# Patient Record
Sex: FEMALE | Race: WHITE | NOT HISPANIC OR LATINO | URBAN - METROPOLITAN AREA
[De-identification: names, ages, dates, MRNs, and addresses within clinical notes are randomized per-mention and may not be internally consistent; named-entity substitution may affect disease eponyms.]

---

## 2017-01-26 ENCOUNTER — OFFICE VISIT - HEALTHEAST (OUTPATIENT)
Dept: GERIATRICS | Facility: CLINIC | Age: 82
End: 2017-01-26

## 2017-01-26 DIAGNOSIS — D64.9 ANEMIA, UNSPECIFIED TYPE: ICD-10-CM

## 2017-01-26 DIAGNOSIS — H91.13 PRESBYCUSIS OF BOTH EARS: ICD-10-CM

## 2017-01-26 DIAGNOSIS — K59.00 CONSTIPATION: ICD-10-CM

## 2017-01-26 DIAGNOSIS — I95.0 IDIOPATHIC HYPOTENSION: ICD-10-CM

## 2017-01-26 DIAGNOSIS — G30.1 LATE ONSET ALZHEIMER'S DISEASE WITHOUT BEHAVIORAL DISTURBANCE (H): ICD-10-CM

## 2017-01-26 DIAGNOSIS — M19.90 OSTEOARTHRITIS: ICD-10-CM

## 2017-01-26 DIAGNOSIS — F02.80 LATE ONSET ALZHEIMER'S DISEASE WITHOUT BEHAVIORAL DISTURBANCE (H): ICD-10-CM

## 2017-02-23 ENCOUNTER — OFFICE VISIT - HEALTHEAST (OUTPATIENT)
Dept: GERIATRICS | Facility: CLINIC | Age: 82
End: 2017-02-23

## 2017-02-23 DIAGNOSIS — H91.13 PRESBYCUSIS OF BOTH EARS: ICD-10-CM

## 2017-02-23 DIAGNOSIS — D64.9 ANEMIA, UNSPECIFIED TYPE: ICD-10-CM

## 2017-02-23 DIAGNOSIS — K59.00 CONSTIPATION: ICD-10-CM

## 2017-02-23 DIAGNOSIS — F02.80 LATE ONSET ALZHEIMER'S DISEASE WITHOUT BEHAVIORAL DISTURBANCE (H): ICD-10-CM

## 2017-02-23 DIAGNOSIS — I95.0 IDIOPATHIC HYPOTENSION: ICD-10-CM

## 2017-02-23 DIAGNOSIS — G30.1 LATE ONSET ALZHEIMER'S DISEASE WITHOUT BEHAVIORAL DISTURBANCE (H): ICD-10-CM

## 2017-02-23 DIAGNOSIS — M19.90 OSTEOARTHRITIS: ICD-10-CM

## 2017-03-31 ENCOUNTER — OFFICE VISIT - HEALTHEAST (OUTPATIENT)
Dept: GERIATRICS | Facility: CLINIC | Age: 82
End: 2017-03-31

## 2017-03-31 DIAGNOSIS — D64.9 ANEMIA: ICD-10-CM

## 2017-03-31 DIAGNOSIS — M19.90 DJD (DEGENERATIVE JOINT DISEASE): ICD-10-CM

## 2017-03-31 DIAGNOSIS — F02.80 ALZHEIMER'S DEMENTIA WITHOUT BEHAVIORAL DISTURBANCE (H): ICD-10-CM

## 2017-03-31 DIAGNOSIS — I10 ESSENTIAL HYPERTENSION: ICD-10-CM

## 2017-03-31 DIAGNOSIS — G30.9 ALZHEIMER'S DEMENTIA WITHOUT BEHAVIORAL DISTURBANCE (H): ICD-10-CM

## 2017-04-27 ENCOUNTER — OFFICE VISIT - HEALTHEAST (OUTPATIENT)
Dept: GERIATRICS | Facility: CLINIC | Age: 82
End: 2017-04-27

## 2017-04-27 DIAGNOSIS — F02.80 LATE ONSET ALZHEIMER'S DISEASE WITHOUT BEHAVIORAL DISTURBANCE (H): ICD-10-CM

## 2017-04-27 DIAGNOSIS — H91.13 PRESBYCUSIS OF BOTH EARS: ICD-10-CM

## 2017-04-27 DIAGNOSIS — K59.00 CONSTIPATION, UNSPECIFIED CONSTIPATION TYPE: ICD-10-CM

## 2017-04-27 DIAGNOSIS — G30.1 LATE ONSET ALZHEIMER'S DISEASE WITHOUT BEHAVIORAL DISTURBANCE (H): ICD-10-CM

## 2017-04-27 DIAGNOSIS — I95.0 IDIOPATHIC HYPOTENSION: ICD-10-CM

## 2017-04-27 DIAGNOSIS — M19.90 OSTEOARTHRITIS: ICD-10-CM

## 2017-05-11 ENCOUNTER — OFFICE VISIT - HEALTHEAST (OUTPATIENT)
Dept: GERIATRICS | Facility: CLINIC | Age: 82
End: 2017-05-11

## 2017-05-11 DIAGNOSIS — G30.1 LATE ONSET ALZHEIMER'S DISEASE WITHOUT BEHAVIORAL DISTURBANCE (H): ICD-10-CM

## 2017-05-11 DIAGNOSIS — F02.80 LATE ONSET ALZHEIMER'S DISEASE WITHOUT BEHAVIORAL DISTURBANCE (H): ICD-10-CM

## 2017-05-11 DIAGNOSIS — K59.00 CONSTIPATION, UNSPECIFIED CONSTIPATION TYPE: ICD-10-CM

## 2017-05-11 DIAGNOSIS — Z87.19 S/P DILATATION OF ESOPHAGEAL STRICTURE: ICD-10-CM

## 2017-05-11 DIAGNOSIS — M19.90 OSTEOARTHRITIS: ICD-10-CM

## 2017-05-11 DIAGNOSIS — H91.13 PRESBYCUSIS OF BOTH EARS: ICD-10-CM

## 2017-05-11 DIAGNOSIS — Z98.890 S/P DILATATION OF ESOPHAGEAL STRICTURE: ICD-10-CM

## 2017-05-11 DIAGNOSIS — I95.0 IDIOPATHIC HYPOTENSION: ICD-10-CM

## 2017-06-30 ENCOUNTER — OFFICE VISIT - HEALTHEAST (OUTPATIENT)
Dept: GERIATRICS | Facility: CLINIC | Age: 82
End: 2017-06-30

## 2017-06-30 DIAGNOSIS — D64.9 ANEMIA, UNSPECIFIED TYPE: ICD-10-CM

## 2017-06-30 DIAGNOSIS — G30.1 LATE ONSET ALZHEIMER'S DISEASE WITHOUT BEHAVIORAL DISTURBANCE (H): ICD-10-CM

## 2017-06-30 DIAGNOSIS — M19.90 DJD (DEGENERATIVE JOINT DISEASE): ICD-10-CM

## 2017-06-30 DIAGNOSIS — I10 ESSENTIAL HYPERTENSION: ICD-10-CM

## 2017-06-30 DIAGNOSIS — F02.80 LATE ONSET ALZHEIMER'S DISEASE WITHOUT BEHAVIORAL DISTURBANCE (H): ICD-10-CM

## 2017-07-27 ENCOUNTER — OFFICE VISIT - HEALTHEAST (OUTPATIENT)
Dept: GERIATRICS | Facility: CLINIC | Age: 82
End: 2017-07-27

## 2017-07-27 DIAGNOSIS — D64.9 ANEMIA, UNSPECIFIED TYPE: ICD-10-CM

## 2017-07-27 DIAGNOSIS — H91.13 PRESBYCUSIS OF BOTH EARS: ICD-10-CM

## 2017-07-27 DIAGNOSIS — I95.0 IDIOPATHIC HYPOTENSION: ICD-10-CM

## 2017-07-27 DIAGNOSIS — F02.80 LATE ONSET ALZHEIMER'S DISEASE WITHOUT BEHAVIORAL DISTURBANCE (H): ICD-10-CM

## 2017-07-27 DIAGNOSIS — Z87.19 S/P DILATATION OF ESOPHAGEAL STRICTURE: ICD-10-CM

## 2017-07-27 DIAGNOSIS — G30.1 LATE ONSET ALZHEIMER'S DISEASE WITHOUT BEHAVIORAL DISTURBANCE (H): ICD-10-CM

## 2017-07-27 DIAGNOSIS — K59.00 CONSTIPATION, UNSPECIFIED CONSTIPATION TYPE: ICD-10-CM

## 2017-07-27 DIAGNOSIS — M15.0 PRIMARY OSTEOARTHRITIS INVOLVING MULTIPLE JOINTS: ICD-10-CM

## 2017-07-27 DIAGNOSIS — Z98.890 S/P DILATATION OF ESOPHAGEAL STRICTURE: ICD-10-CM

## 2017-08-14 ENCOUNTER — OFFICE VISIT - HEALTHEAST (OUTPATIENT)
Dept: GERIATRICS | Facility: CLINIC | Age: 82
End: 2017-08-14

## 2017-08-14 DIAGNOSIS — Z98.890 S/P DILATATION OF ESOPHAGEAL STRICTURE: ICD-10-CM

## 2017-08-14 DIAGNOSIS — M15.0 PRIMARY OSTEOARTHRITIS INVOLVING MULTIPLE JOINTS: ICD-10-CM

## 2017-08-14 DIAGNOSIS — I95.0 IDIOPATHIC HYPOTENSION: ICD-10-CM

## 2017-08-14 DIAGNOSIS — K59.00 CONSTIPATION, UNSPECIFIED CONSTIPATION TYPE: ICD-10-CM

## 2017-08-14 DIAGNOSIS — D64.9 ANEMIA, UNSPECIFIED TYPE: ICD-10-CM

## 2017-08-14 DIAGNOSIS — H91.13 PRESBYCUSIS OF BOTH EARS: ICD-10-CM

## 2017-08-14 DIAGNOSIS — G30.1 LATE ONSET ALZHEIMER'S DISEASE WITHOUT BEHAVIORAL DISTURBANCE (H): ICD-10-CM

## 2017-08-14 DIAGNOSIS — Z87.19 S/P DILATATION OF ESOPHAGEAL STRICTURE: ICD-10-CM

## 2017-08-14 DIAGNOSIS — F02.80 LATE ONSET ALZHEIMER'S DISEASE WITHOUT BEHAVIORAL DISTURBANCE (H): ICD-10-CM

## 2017-09-30 ENCOUNTER — OFFICE VISIT - HEALTHEAST (OUTPATIENT)
Dept: GERIATRICS | Facility: CLINIC | Age: 82
End: 2017-09-30

## 2017-09-30 DIAGNOSIS — F03.90 DEMENTIA (H): ICD-10-CM

## 2017-09-30 DIAGNOSIS — D64.9 ANEMIA, UNSPECIFIED TYPE: ICD-10-CM

## 2017-09-30 DIAGNOSIS — I10 ESSENTIAL HYPERTENSION: ICD-10-CM

## 2017-09-30 DIAGNOSIS — M19.90 DJD (DEGENERATIVE JOINT DISEASE): ICD-10-CM

## 2017-10-12 ENCOUNTER — OFFICE VISIT - HEALTHEAST (OUTPATIENT)
Dept: GERIATRICS | Facility: CLINIC | Age: 82
End: 2017-10-12

## 2017-10-12 DIAGNOSIS — F02.80 LATE ONSET ALZHEIMER'S DISEASE WITHOUT BEHAVIORAL DISTURBANCE (H): ICD-10-CM

## 2017-10-12 DIAGNOSIS — D64.9 ANEMIA, UNSPECIFIED TYPE: ICD-10-CM

## 2017-10-12 DIAGNOSIS — Z98.890 S/P DILATATION OF ESOPHAGEAL STRICTURE: ICD-10-CM

## 2017-10-12 DIAGNOSIS — G30.1 LATE ONSET ALZHEIMER'S DISEASE WITHOUT BEHAVIORAL DISTURBANCE (H): ICD-10-CM

## 2017-10-12 DIAGNOSIS — Z87.19 S/P DILATATION OF ESOPHAGEAL STRICTURE: ICD-10-CM

## 2017-10-12 DIAGNOSIS — K59.00 CONSTIPATION, UNSPECIFIED CONSTIPATION TYPE: ICD-10-CM

## 2017-10-12 DIAGNOSIS — M15.0 PRIMARY OSTEOARTHRITIS INVOLVING MULTIPLE JOINTS: ICD-10-CM

## 2017-10-12 DIAGNOSIS — H91.13 PRESBYCUSIS OF BOTH EARS: ICD-10-CM

## 2017-10-12 DIAGNOSIS — I95.0 IDIOPATHIC HYPOTENSION: ICD-10-CM

## 2017-11-02 ENCOUNTER — OFFICE VISIT - HEALTHEAST (OUTPATIENT)
Dept: GERIATRICS | Facility: CLINIC | Age: 82
End: 2017-11-02

## 2017-11-02 DIAGNOSIS — Z87.19 S/P DILATATION OF ESOPHAGEAL STRICTURE: ICD-10-CM

## 2017-11-02 DIAGNOSIS — M15.0 PRIMARY OSTEOARTHRITIS INVOLVING MULTIPLE JOINTS: ICD-10-CM

## 2017-11-02 DIAGNOSIS — Z98.890 S/P DILATATION OF ESOPHAGEAL STRICTURE: ICD-10-CM

## 2017-11-02 DIAGNOSIS — D64.9 ANEMIA, UNSPECIFIED TYPE: ICD-10-CM

## 2017-11-02 DIAGNOSIS — G30.1 LATE ONSET ALZHEIMER'S DISEASE WITHOUT BEHAVIORAL DISTURBANCE (H): ICD-10-CM

## 2017-11-02 DIAGNOSIS — I95.0 IDIOPATHIC HYPOTENSION: ICD-10-CM

## 2017-11-02 DIAGNOSIS — F02.80 LATE ONSET ALZHEIMER'S DISEASE WITHOUT BEHAVIORAL DISTURBANCE (H): ICD-10-CM

## 2017-11-02 DIAGNOSIS — K59.00 CONSTIPATION, UNSPECIFIED CONSTIPATION TYPE: ICD-10-CM

## 2017-11-02 DIAGNOSIS — H91.13 PRESBYCUSIS OF BOTH EARS: ICD-10-CM

## 2017-12-31 ENCOUNTER — OFFICE VISIT - HEALTHEAST (OUTPATIENT)
Dept: GERIATRICS | Facility: CLINIC | Age: 82
End: 2017-12-31

## 2017-12-31 DIAGNOSIS — F02.80 LATE ONSET ALZHEIMER'S DISEASE WITHOUT BEHAVIORAL DISTURBANCE (H): ICD-10-CM

## 2017-12-31 DIAGNOSIS — G30.1 LATE ONSET ALZHEIMER'S DISEASE WITHOUT BEHAVIORAL DISTURBANCE (H): ICD-10-CM

## 2017-12-31 DIAGNOSIS — M19.90 DJD (DEGENERATIVE JOINT DISEASE): ICD-10-CM

## 2017-12-31 DIAGNOSIS — I10 ESSENTIAL HYPERTENSION: ICD-10-CM

## 2017-12-31 DIAGNOSIS — D64.9 ANEMIA, UNSPECIFIED TYPE: ICD-10-CM

## 2018-01-01 ENCOUNTER — COMMUNICATION - HEALTHEAST (OUTPATIENT)
Dept: GERIATRICS | Facility: CLINIC | Age: 83
End: 2018-01-01

## 2018-01-01 ENCOUNTER — RECORDS - HEALTHEAST (OUTPATIENT)
Dept: LAB | Facility: CLINIC | Age: 83
End: 2018-01-01

## 2018-01-01 ENCOUNTER — OFFICE VISIT - HEALTHEAST (OUTPATIENT)
Dept: GERIATRICS | Facility: CLINIC | Age: 83
End: 2018-01-01

## 2018-01-01 DIAGNOSIS — F02.80 LATE ONSET ALZHEIMER'S DISEASE WITHOUT BEHAVIORAL DISTURBANCE (H): ICD-10-CM

## 2018-01-01 DIAGNOSIS — R41.89 COGNITIVE DECLINE: ICD-10-CM

## 2018-01-01 DIAGNOSIS — R63.4 WEIGHT LOSS: ICD-10-CM

## 2018-01-01 DIAGNOSIS — Z87.19 S/P DILATATION OF ESOPHAGEAL STRICTURE: ICD-10-CM

## 2018-01-01 DIAGNOSIS — H91.13 PRESBYCUSIS OF BOTH EARS: ICD-10-CM

## 2018-01-01 DIAGNOSIS — D64.9 ANEMIA, UNSPECIFIED TYPE: ICD-10-CM

## 2018-01-01 DIAGNOSIS — H40.9 GLAUCOMA: ICD-10-CM

## 2018-01-01 DIAGNOSIS — Z98.890 S/P DILATATION OF ESOPHAGEAL STRICTURE: ICD-10-CM

## 2018-01-01 DIAGNOSIS — R53.81 DEBILITY: ICD-10-CM

## 2018-01-01 DIAGNOSIS — M15.0 PRIMARY OSTEOARTHRITIS INVOLVING MULTIPLE JOINTS: ICD-10-CM

## 2018-01-01 DIAGNOSIS — G30.1 LATE ONSET ALZHEIMER'S DISEASE WITHOUT BEHAVIORAL DISTURBANCE (H): ICD-10-CM

## 2018-01-01 DIAGNOSIS — K59.00 CONSTIPATION, UNSPECIFIED CONSTIPATION TYPE: ICD-10-CM

## 2018-01-01 DIAGNOSIS — I10 ESSENTIAL HYPERTENSION: ICD-10-CM

## 2018-01-01 DIAGNOSIS — I95.0 IDIOPATHIC HYPOTENSION: ICD-10-CM

## 2018-01-01 DIAGNOSIS — D50.9 IRON DEFICIENCY ANEMIA, UNSPECIFIED IRON DEFICIENCY ANEMIA TYPE: ICD-10-CM

## 2018-01-01 LAB
ALBUMIN UR-MCNC: ABNORMAL MG/DL
ANION GAP SERPL CALCULATED.3IONS-SCNC: 7 MMOL/L (ref 5–18)
APPEARANCE UR: ABNORMAL
BACTERIA #/AREA URNS HPF: ABNORMAL HPF
BACTERIA SPEC CULT: NORMAL
BILIRUB UR QL STRIP: NEGATIVE
BUN SERPL-MCNC: 30 MG/DL (ref 8–28)
CALCIUM SERPL-MCNC: 9.4 MG/DL (ref 8.5–10.5)
CHLORIDE BLD-SCNC: 113 MMOL/L (ref 98–107)
CO2 SERPL-SCNC: 22 MMOL/L (ref 22–31)
COLOR UR AUTO: YELLOW
CREAT SERPL-MCNC: 0.71 MG/DL (ref 0.6–1.1)
GFR SERPL CREATININE-BSD FRML MDRD: >60 ML/MIN/1.73M2
GLUCOSE BLD-MCNC: 75 MG/DL (ref 70–125)
GLUCOSE UR STRIP-MCNC: NEGATIVE MG/DL
HGB UR QL STRIP: ABNORMAL
KETONES UR STRIP-MCNC: NEGATIVE MG/DL
LEUKOCYTE ESTERASE UR QL STRIP: ABNORMAL
MUCOUS THREADS #/AREA URNS LPF: ABNORMAL LPF
NITRATE UR QL: NEGATIVE
PH UR STRIP: 5.5 [PH] (ref 4.5–8)
POTASSIUM BLD-SCNC: 4.3 MMOL/L (ref 3.5–5)
RBC #/AREA URNS AUTO: ABNORMAL HPF
SODIUM SERPL-SCNC: 142 MMOL/L (ref 136–145)
SP GR UR STRIP: 1.03 (ref 1–1.03)
SQUAMOUS #/AREA URNS AUTO: ABNORMAL LPF
UROBILINOGEN UR STRIP-ACNC: ABNORMAL
WBC #/AREA URNS AUTO: >100 HPF

## 2018-01-01 ASSESSMENT — MIFFLIN-ST. JEOR: SCORE: 732.42

## 2018-01-18 ENCOUNTER — RECORDS - HEALTHEAST (OUTPATIENT)
Dept: LAB | Facility: CLINIC | Age: 83
End: 2018-01-18

## 2018-01-18 LAB
ANION GAP SERPL CALCULATED.3IONS-SCNC: 6 MMOL/L (ref 5–18)
BUN SERPL-MCNC: 32 MG/DL (ref 8–28)
CALCIUM SERPL-MCNC: 9.1 MG/DL (ref 8.5–10.5)
CHLORIDE BLD-SCNC: 112 MMOL/L (ref 98–107)
CO2 SERPL-SCNC: 24 MMOL/L (ref 22–31)
CREAT SERPL-MCNC: 0.74 MG/DL (ref 0.6–1.1)
GFR SERPL CREATININE-BSD FRML MDRD: >60 ML/MIN/1.73M2
GLUCOSE BLD-MCNC: 74 MG/DL (ref 70–125)
POTASSIUM BLD-SCNC: 3.9 MMOL/L (ref 3.5–5)
SODIUM SERPL-SCNC: 142 MMOL/L (ref 136–145)

## 2018-02-01 ENCOUNTER — OFFICE VISIT - HEALTHEAST (OUTPATIENT)
Dept: GERIATRICS | Facility: CLINIC | Age: 83
End: 2018-02-01

## 2018-02-01 DIAGNOSIS — G30.1 LATE ONSET ALZHEIMER'S DISEASE WITHOUT BEHAVIORAL DISTURBANCE (H): ICD-10-CM

## 2018-02-01 DIAGNOSIS — Z87.19 S/P DILATATION OF ESOPHAGEAL STRICTURE: ICD-10-CM

## 2018-02-01 DIAGNOSIS — H91.13 PRESBYCUSIS OF BOTH EARS: ICD-10-CM

## 2018-02-01 DIAGNOSIS — Z98.890 S/P DILATATION OF ESOPHAGEAL STRICTURE: ICD-10-CM

## 2018-02-01 DIAGNOSIS — I95.0 IDIOPATHIC HYPOTENSION: ICD-10-CM

## 2018-02-01 DIAGNOSIS — K59.00 CONSTIPATION, UNSPECIFIED CONSTIPATION TYPE: ICD-10-CM

## 2018-02-01 DIAGNOSIS — F02.80 LATE ONSET ALZHEIMER'S DISEASE WITHOUT BEHAVIORAL DISTURBANCE (H): ICD-10-CM

## 2018-02-01 DIAGNOSIS — M15.0 PRIMARY OSTEOARTHRITIS INVOLVING MULTIPLE JOINTS: ICD-10-CM

## 2018-02-01 DIAGNOSIS — D64.9 ANEMIA, UNSPECIFIED TYPE: ICD-10-CM

## 2018-02-02 ENCOUNTER — AMBULATORY - HEALTHEAST (OUTPATIENT)
Dept: GERIATRICS | Facility: CLINIC | Age: 83
End: 2018-02-02

## 2018-04-30 ENCOUNTER — OFFICE VISIT - HEALTHEAST (OUTPATIENT)
Dept: GERIATRICS | Facility: CLINIC | Age: 83
End: 2018-04-30

## 2018-04-30 DIAGNOSIS — I10 HYPERTENSION: ICD-10-CM

## 2018-04-30 DIAGNOSIS — R53.81 DEBILITY: ICD-10-CM

## 2018-04-30 DIAGNOSIS — R41.89 COGNITIVE IMPAIRMENT: ICD-10-CM

## 2018-04-30 DIAGNOSIS — D64.9 ANEMIA, UNSPECIFIED TYPE: ICD-10-CM

## 2018-06-07 ENCOUNTER — OFFICE VISIT - HEALTHEAST (OUTPATIENT)
Dept: GERIATRICS | Facility: CLINIC | Age: 83
End: 2018-06-07

## 2018-06-07 DIAGNOSIS — G30.1 LATE ONSET ALZHEIMER'S DISEASE WITHOUT BEHAVIORAL DISTURBANCE (H): ICD-10-CM

## 2018-06-07 DIAGNOSIS — M15.0 PRIMARY OSTEOARTHRITIS INVOLVING MULTIPLE JOINTS: ICD-10-CM

## 2018-06-07 DIAGNOSIS — D64.9 ANEMIA, UNSPECIFIED TYPE: ICD-10-CM

## 2018-06-07 DIAGNOSIS — H91.13 PRESBYCUSIS OF BOTH EARS: ICD-10-CM

## 2018-06-07 DIAGNOSIS — Z87.19 STATUS POST DILATION OF ESOPHAGEAL NARROWING: ICD-10-CM

## 2018-06-07 DIAGNOSIS — Z98.890 STATUS POST DILATION OF ESOPHAGEAL NARROWING: ICD-10-CM

## 2018-06-07 DIAGNOSIS — K59.00 CONSTIPATION, UNSPECIFIED CONSTIPATION TYPE: ICD-10-CM

## 2018-06-07 DIAGNOSIS — F02.80 LATE ONSET ALZHEIMER'S DISEASE WITHOUT BEHAVIORAL DISTURBANCE (H): ICD-10-CM

## 2018-06-07 DIAGNOSIS — I95.0 IDIOPATHIC HYPOTENSION: ICD-10-CM

## 2018-08-31 ENCOUNTER — OFFICE VISIT - HEALTHEAST (OUTPATIENT)
Dept: GERIATRICS | Facility: CLINIC | Age: 83
End: 2018-08-31

## 2018-08-31 DIAGNOSIS — R41.89 COGNITIVE DECLINE: ICD-10-CM

## 2018-08-31 DIAGNOSIS — D64.9 ANEMIA, UNSPECIFIED TYPE: ICD-10-CM

## 2018-08-31 DIAGNOSIS — R53.81 DEBILITY: ICD-10-CM

## 2018-08-31 DIAGNOSIS — I10 ESSENTIAL HYPERTENSION: ICD-10-CM

## 2018-09-05 ENCOUNTER — RECORDS - HEALTHEAST (OUTPATIENT)
Dept: LAB | Facility: CLINIC | Age: 83
End: 2018-09-05

## 2018-09-06 LAB
ERYTHROCYTE [DISTWIDTH] IN BLOOD BY AUTOMATED COUNT: 14.3 % (ref 11–14.5)
HCT VFR BLD AUTO: 32.9 % (ref 35–47)
HGB BLD-MCNC: 11 G/DL (ref 12–16)
MCH RBC QN AUTO: 30.2 PG (ref 27–34)
MCHC RBC AUTO-ENTMCNC: 33.4 G/DL (ref 32–36)
MCV RBC AUTO: 90 FL (ref 80–100)
PLATELET # BLD AUTO: 202 THOU/UL (ref 140–440)
PMV BLD AUTO: 10.6 FL (ref 8.5–12.5)
RBC # BLD AUTO: 3.64 MILL/UL (ref 3.8–5.4)
WBC: 4.7 THOU/UL (ref 4–11)

## 2018-09-07 ENCOUNTER — COMMUNICATION - HEALTHEAST (OUTPATIENT)
Dept: GERIATRICS | Facility: CLINIC | Age: 83
End: 2018-09-07

## 2019-01-01 ENCOUNTER — HOME CARE/HOSPICE - HEALTHEAST (OUTPATIENT)
Dept: HOSPICE | Facility: HOSPICE | Age: 84
End: 2019-01-01

## 2019-01-01 ENCOUNTER — COMMUNICATION - HEALTHEAST (OUTPATIENT)
Dept: GERIATRICS | Facility: CLINIC | Age: 84
End: 2019-01-01

## 2019-01-01 ENCOUNTER — OFFICE VISIT - HEALTHEAST (OUTPATIENT)
Dept: GERIATRICS | Facility: CLINIC | Age: 84
End: 2019-01-01

## 2019-01-01 ENCOUNTER — AMBULATORY - HEALTHEAST (OUTPATIENT)
Dept: OTHER | Facility: CLINIC | Age: 84
End: 2019-01-01

## 2019-01-01 ENCOUNTER — RECORDS - HEALTHEAST (OUTPATIENT)
Dept: LAB | Facility: CLINIC | Age: 84
End: 2019-01-01

## 2019-01-01 ENCOUNTER — AMBULATORY - HEALTHEAST (OUTPATIENT)
Dept: HOSPICE | Facility: HOSPICE | Age: 84
End: 2019-01-01

## 2019-01-01 ENCOUNTER — AMBULATORY - HEALTHEAST (OUTPATIENT)
Dept: GERIATRICS | Facility: CLINIC | Age: 84
End: 2019-01-01

## 2019-01-01 DIAGNOSIS — D50.9 IRON DEFICIENCY ANEMIA, UNSPECIFIED IRON DEFICIENCY ANEMIA TYPE: ICD-10-CM

## 2019-01-01 DIAGNOSIS — I10 ESSENTIAL HYPERTENSION: ICD-10-CM

## 2019-01-01 DIAGNOSIS — G30.1 LATE ONSET ALZHEIMER'S DISEASE WITHOUT BEHAVIORAL DISTURBANCE (H): ICD-10-CM

## 2019-01-01 DIAGNOSIS — R63.4 WEIGHT LOSS: ICD-10-CM

## 2019-01-01 DIAGNOSIS — G30.9 ALZHEIMER'S DEMENTIA WITH BEHAVIORAL DISTURBANCE, UNSPECIFIED TIMING OF DEMENTIA ONSET: ICD-10-CM

## 2019-01-01 DIAGNOSIS — R53.81 DEBILITY: ICD-10-CM

## 2019-01-01 DIAGNOSIS — Z51.5 END OF LIFE CARE: ICD-10-CM

## 2019-01-01 DIAGNOSIS — F02.80 LATE ONSET ALZHEIMER'S DISEASE WITHOUT BEHAVIORAL DISTURBANCE (H): ICD-10-CM

## 2019-01-01 DIAGNOSIS — R64 CACHEXIA (H): ICD-10-CM

## 2019-01-01 DIAGNOSIS — R41.89 COGNITIVE DECLINE: ICD-10-CM

## 2019-01-01 DIAGNOSIS — K59.00 CONSTIPATION, UNSPECIFIED CONSTIPATION TYPE: ICD-10-CM

## 2019-01-01 DIAGNOSIS — Z98.890 S/P DILATATION OF ESOPHAGEAL STRICTURE: ICD-10-CM

## 2019-01-01 DIAGNOSIS — H40.10X0 OPEN-ANGLE GLAUCOMA, UNSPECIFIED GLAUCOMA STAGE, UNSPECIFIED LATERALITY, UNSPECIFIED OPEN-ANGLE GLAUCOMA TYPE: ICD-10-CM

## 2019-01-01 DIAGNOSIS — I95.0 IDIOPATHIC HYPOTENSION: ICD-10-CM

## 2019-01-01 DIAGNOSIS — Z87.19 S/P DILATATION OF ESOPHAGEAL STRICTURE: ICD-10-CM

## 2019-01-01 DIAGNOSIS — H91.13 PRESBYCUSIS OF BOTH EARS: ICD-10-CM

## 2019-01-01 DIAGNOSIS — M15.0 PRIMARY OSTEOARTHRITIS INVOLVING MULTIPLE JOINTS: ICD-10-CM

## 2019-01-01 DIAGNOSIS — F02.81 ALZHEIMER'S DEMENTIA WITH BEHAVIORAL DISTURBANCE, UNSPECIFIED TIMING OF DEMENTIA ONSET: ICD-10-CM

## 2019-01-01 LAB
ALBUMIN UR-MCNC: ABNORMAL MG/DL
APPEARANCE UR: ABNORMAL
BACTERIA #/AREA URNS HPF: ABNORMAL HPF
BACTERIA SPEC CULT: ABNORMAL
BILIRUB UR QL STRIP: NEGATIVE
COLOR UR AUTO: YELLOW
GLUCOSE UR STRIP-MCNC: NEGATIVE MG/DL
HGB UR QL STRIP: ABNORMAL
HYALINE CASTS #/AREA URNS LPF: ABNORMAL LPF
KETONES UR STRIP-MCNC: NEGATIVE MG/DL
LEUKOCYTE ESTERASE UR QL STRIP: ABNORMAL
MUCOUS THREADS #/AREA URNS LPF: ABNORMAL LPF
NITRATE UR QL: POSITIVE
PH UR STRIP: 5 [PH] (ref 4.5–8)
RBC #/AREA URNS AUTO: ABNORMAL HPF
SP GR UR STRIP: 1.02 (ref 1–1.03)
SQUAMOUS #/AREA URNS AUTO: ABNORMAL LPF
UROBILINOGEN UR STRIP-ACNC: ABNORMAL
WBC #/AREA URNS AUTO: ABNORMAL HPF

## 2019-01-01 RX ORDER — HYDROMORPHONE HYDROCHLORIDE 1 MG/ML
2 SOLUTION ORAL
Status: SHIPPED | COMMUNITY
Start: 2019-01-01

## 2019-01-01 RX ORDER — HYDROMORPHONE HYDROCHLORIDE 1 MG/ML
2 SOLUTION ORAL EVERY 4 HOURS
Status: SHIPPED | COMMUNITY
Start: 2019-01-01

## 2019-01-01 RX ORDER — HALOPERIDOL 0.5 MG/1
2 TABLET ORAL
Status: SHIPPED | COMMUNITY
Start: 2019-01-01

## 2019-01-01 RX ORDER — HALOPERIDOL 0.5 MG/1
5 TABLET ORAL ONCE
Status: SHIPPED | COMMUNITY
Start: 2019-01-01

## 2019-01-01 ASSESSMENT — MIFFLIN-ST. JEOR
SCORE: 703.39
SCORE: 726.98

## 2021-05-26 VITALS
DIASTOLIC BLOOD PRESSURE: 52 MMHG | RESPIRATION RATE: 18 BRPM | HEART RATE: 62 BPM | SYSTOLIC BLOOD PRESSURE: 116 MMHG | OXYGEN SATURATION: 96 %

## 2021-05-26 VITALS
RESPIRATION RATE: 20 BRPM | OXYGEN SATURATION: 96 % | DIASTOLIC BLOOD PRESSURE: 60 MMHG | SYSTOLIC BLOOD PRESSURE: 108 MMHG | HEART RATE: 67 BPM

## 2021-05-26 VITALS
SYSTOLIC BLOOD PRESSURE: 70 MMHG | DIASTOLIC BLOOD PRESSURE: 38 MMHG | HEART RATE: 100 BPM | RESPIRATION RATE: 24 BRPM | OXYGEN SATURATION: 78 % | TEMPERATURE: 99.2 F

## 2021-05-26 VITALS
SYSTOLIC BLOOD PRESSURE: 90 MMHG | HEART RATE: 80 BPM | OXYGEN SATURATION: 87 % | RESPIRATION RATE: 20 BRPM | DIASTOLIC BLOOD PRESSURE: 40 MMHG

## 2021-05-26 VITALS — OXYGEN SATURATION: 87 % | SYSTOLIC BLOOD PRESSURE: 104 MMHG | DIASTOLIC BLOOD PRESSURE: 50 MMHG

## 2021-05-26 VITALS
HEART RATE: 81 BPM | DIASTOLIC BLOOD PRESSURE: 50 MMHG | RESPIRATION RATE: 16 BRPM | SYSTOLIC BLOOD PRESSURE: 90 MMHG | OXYGEN SATURATION: 95 %

## 2021-05-26 VITALS
OXYGEN SATURATION: 95 % | DIASTOLIC BLOOD PRESSURE: 59 MMHG | SYSTOLIC BLOOD PRESSURE: 118 MMHG | RESPIRATION RATE: 13 BRPM | HEART RATE: 69 BPM

## 2021-05-26 VITALS
DIASTOLIC BLOOD PRESSURE: 50 MMHG | RESPIRATION RATE: 16 BRPM | HEART RATE: 70 BPM | SYSTOLIC BLOOD PRESSURE: 100 MMHG | OXYGEN SATURATION: 93 %

## 2021-05-26 VITALS
SYSTOLIC BLOOD PRESSURE: 112 MMHG | HEART RATE: 71 BPM | RESPIRATION RATE: 16 BRPM | OXYGEN SATURATION: 91 % | DIASTOLIC BLOOD PRESSURE: 60 MMHG

## 2021-05-26 VITALS — RESPIRATION RATE: 10 BRPM | HEART RATE: 100 BPM | OXYGEN SATURATION: 81 %

## 2021-05-28 NOTE — TELEPHONE ENCOUNTER
[facility] Nurse Triage Telephone Note      Provider: HAJA Chaidez  Facility: [facility] Type: LTC    Caller: Luzmaria  Call Back Number:  [phone]    Allergies: Codeine    Reason for call: Pt had an episode of choking in the dining room today, was able to clear on her own. When looking in her mouth the nursing staff had noticed 2 lumps on her left molars that could be puss filled. Also, her left cheek looks to be puffy.   Vitals: BP:  95/57  P:: 67    SPO2: 97% R/A Temp.:  98.0    No pain, LS clear  Verbal Order/Direction given by Provider: Make dental appointment asap    Provider giving order: HAJA Chaidez    Verbal order given to: Luzmaria Peace RN

## 2021-05-28 NOTE — PROGRESS NOTES
[facility]    Facility:   [facility] [[id number]]   Code Status: DNR/DNI       Chief Complaint   Patient presents with     Review Of Multiple Medical Conditions     LTC 4/29/19.       HPI:  Naina is a 89 y.o. female seen for routine physician LT follow up at [facility]. She has diagnoses of HTN, not currently on BP meds, anemia on iron, DJD, dementia. She is pleasant and has no disruptive behavioral issues, generally pleasantly confused. She has visual impairment and is on eye drops.     Today:  Current UTI finished abx, some increased confusion and delusions documented per nursing. Today she has no new complaints. She continues to ambulate with her walker but has needed more assistance and guidance, encouragement, uses wheelchair more lately. Has arthritic pains. She has not been ill recently with cough cold or congestion. Denies shortness of breath, chest pain, diarrhea or abdominal pain.      Past Medical History:  Past Medical History:   Diagnosis Date     Cancer (H)     Anal     Constipation     Chronic     Dementia      Hypertension      Sciatica        Medications:  Current Outpatient Medications   Medication Sig     acetaminophen (TYLENOL) 325 MG tablet Take 650 mg by mouth 3 (three) times a day.      acetaminophen (TYLENOL) 325 MG tablet Take 650 mg by mouth every 4 (four) hours as needed for pain.     aspirin 81 MG EC tablet Take 81 mg by mouth daily.     bisacodyl (DULCOLAX) 10 mg suppository Insert 10 mg into the rectum daily as needed.     calcium, as carbonate, (TUMS) 200 mg calcium (500 mg) chewable tablet Chew 1 tablet 2 (two) times a day.     docusate sodium (COLACE) 100 MG capsule Take 100 mg by mouth once daily.     dorzolamide (TRUSOPT) 2 % ophthalmic solution Administer 1 drop to both eyes 2 (two) times a day.     ferrous sulfate 325 (65 FE) MG tablet Take 1 tablet by mouth daily with breakfast.      latanoprost (XALATAN) 0.005 %  ophthalmic solution Administer 1 drop to both eyes bedtime.     omeprazole (PRILOSEC) 20 MG capsule Take 20 mg by mouth daily.     polyethylene glycol (MIRALAX) 17 gram packet Take 17 g by mouth daily.      polyethylene glycol (MIRALAX) 17 gram packet Take 17 g by mouth daily as needed.     polyvinyl alcohol (LIQUIFILM TEARS) 1.4 % ophthalmic solution Administer 1 drop to both eyes every 2 (two) hours as needed for dry eyes.     timolol maleate (TIMOPTIC) 0.25 % ophthalmic solution Administer 1 drop to both eyes 2 (two) times a day.     timolol maleate (TIMOPTIC) 0.5 % ophthalmic solution Administer 1 drop to both eyes 2 (two) times a day.       Physical Exam:  General: Patient is alert, pleasant elderly female, no distress.   Vitals: /62, Temp 97.5, Pulse 62, RR 20, O2 sat 95% RA.  HEENT: Head is NCAT. Eyes show no injection or icterus. Nares negative. Oropharynx well hydrated.  Lungs: Clear bilaterally. No wheezes.  Cardiovascular: Regular rate and rhythm, systolic murmur.  Abdomen: Soft, no tenderness on exam. Bowel sounds present. No guarding rebound or rigidity.  Extremities: No edema is noted.  Musculoskeletal: Age related degen changes. Marked deformities of fingers.  Psych: Mood appears good.      Labs:  Lab Results   Component Value Date    WBC 4.7 09/06/2018    HGB 11.0 (L) 09/06/2018    HCT 32.9 (L) 09/06/2018    MCV 90 09/06/2018     09/06/2018     Results for orders placed or performed in visit on 12/10/18   Basic Metabolic Panel   Result Value Ref Range    Sodium 142 136 - 145 mmol/L    Potassium 4.3 3.5 - 5.0 mmol/L    Chloride 113 (H) 98 - 107 mmol/L    CO2 22 22 - 31 mmol/L    Anion Gap, Calculation 7 5 - 18 mmol/L    Glucose 75 70 - 125 mg/dL    Calcium 9.4 8.5 - 10.5 mg/dL    BUN 30 (H) 8 - 28 mg/dL    Creatinine 0.71 0.60 - 1.10 mg/dL    GFR MDRD Af Amer >60 >60 mL/min/1.73m2    GFR MDRD Non Af Amer >60 >60 mL/min/1.73m2         Assessment/Plan:  1. DJD. General debilitation. Slow  decline, seems to use wheelchair more than walker now.   2. HTN. Not on medications. BPs satisfactory.  3. Anemia. She is on iron. Last hgb at 11, stable.   4. Cognitive decline. Pleasant and forgetful. Confusion noted per nursing.  5. UTI. Done with abx. No fever.         Electronically signed by: Sandra Wing MD

## 2021-05-29 NOTE — PROGRESS NOTES
[facility]    Name:   Naina Lozada  : 1923  Facility:   [facility] [[id number]]   Room: 217  Code Status: DNR/DNI -   Fac type:   NF (Long Term Care, LTC) -     CHIEF COMPLAINT / REASON FOR VISIT:  Chief Complaint   Patient presents with     Review Of Multiple Medical Conditions     Patient was last seen by me on 2019 and subsequently seen by Dr. Wing on 2019.      HPI: Naina is a very pleasant 89 y.o. female with mild dementia (without behavioral disturbance) and severe hearing loss, who is pretty much independent, ambulatory with a walker and without human assistance, independent with bed mobility, and continent of both bowel and bladder. She exercises every day, usually by walking a good distance around the halls. She has asked me if there is anything to straighten her severely kyphoscoliotic back.  This is about the only complaint she offers.    She has chronic intermittent constipation but may have occasional loose stools. She receives both scheduled Colace (100 mg QD) and MiraLAX (QD and QD PRN), and this has been working well.      She also has a distant history of anal ulcers, as well as a history of urinary retention with incomplete bladder emptying (no current problems), and some right-sided sciatica, although she has offered no complaints over the last few years.     In 2015 she was evaluated by GI, undergoing an upper endoscopy and later a dilatation due to a esophageal stricture at the GE junction. Also noted were Ayush's erosions in a large hiatal hernia, and she is now receiving a proton pump inhibitor with iron supplementation to address her anemia. She was subsequently seen by Dr. Sin in 2016 due to recurrence of her swallowing difficulty. She confirmed that sometimes it took 2 or 3 tries to swallow food. When the dilatation was performed one month earlier by Dr. Raghu Mills, he stated in his note that if  "swallowing difficulty recurred, a video swallow esophogram should be done with a follow-up in their esophageal clinic prior to further endoscopic dilatation. This was performed on 04/14/16. The image acquisition was compromised due to limited patient mobility, but what was noted was a moderate sized sliding hiatal hernia with large volume of gastroesophageal reflux, a Schatzki's ring, and small cervical esophageal diverticulum but no significant stricture. She needs to be careful, making sure that what she eats is in small pieces.    She has a history of anemia.  We did decrease her ferrous sulfate from 325 mg twice daily to 325 mg daily back in May 2017.  A follow-up hemoglobin one month later was 11.3.       CURRENT ISSUES    Osteoarthritis: General stiffness all over, particularly in the morning, but she rarely complained.  Up until my last visit, she was independent and ambulatory every morning; however, the arthritis was taking its toll.  She became mostly wheelchair-bound but was still able to get around using her legs to propel herself.  She was still walking occasionally.  Pain continues to be an issue, but she is nothing if not persistent.  She has described her whole body being in pain, but she does not want more pain medications.  Today, she has gotten herself dressed and is ambulating again independently.  She states, \"I suppose I still have pain, but I walk anyway.\"    Hearing loss: Quite significant.  She can hear with lips almost to the ears.    Weight loss: Unfortunately, she is losing weight.  She was down 4.5 pounds at my last visit and is down another 5.2 pounds.  In October 2018, she was started on Thrive ice cream.    ROS: She remains continent of bowel and bladder.  No complaints of headaches, chest pain, nausea or vomiting, coughing or congestion, dizziness or dyspnea, dysuria, integumentary issues, problems with sleep, or feelings of depression or anxiety.    Past Medical History:   Diagnosis " "Date     Cancer (H)     Anal     Constipation     Chronic     Dementia      Hypertension      Sciatica              No family history on file.    MEDICATIONS: Reviewed from the MAR, physician orders, and earlier progress notes.   Current Outpatient Medications   Medication Sig     acetaminophen (TYLENOL) 325 MG tablet Take 650 mg by mouth 3 (three) times a day.      acetaminophen (TYLENOL) 325 MG tablet Take 650 mg by mouth every 4 (four) hours as needed for pain.     aspirin 81 MG EC tablet Take 81 mg by mouth daily.     bisacodyl (DULCOLAX) 10 mg suppository Insert 10 mg into the rectum daily as needed.     calcium, as carbonate, (TUMS) 200 mg calcium (500 mg) chewable tablet Chew 1 tablet 2 (two) times a day.     docusate sodium (COLACE) 100 MG capsule Take 100 mg by mouth once daily.     dorzolamide (TRUSOPT) 2 % ophthalmic solution Administer 1 drop to both eyes 2 (two) times a day.     ferrous sulfate 325 (65 FE) MG tablet Take 1 tablet by mouth daily with breakfast.      latanoprost (XALATAN) 0.005 % ophthalmic solution Administer 1 drop to both eyes bedtime.     omeprazole (PRILOSEC) 20 MG capsule Take 20 mg by mouth daily.     polyethylene glycol (MIRALAX) 17 gram packet Take 17 g by mouth daily.      polyethylene glycol (MIRALAX) 17 gram packet Take 17 g by mouth daily as needed.     polyvinyl alcohol (LIQUIFILM TEARS) 1.4 % ophthalmic solution Administer 1 drop to both eyes every 2 (two) hours as needed for dry eyes.     timolol maleate (TIMOPTIC) 0.25 % ophthalmic solution Administer 1 drop to both eyes 2 (two) times a day.     timolol maleate (TIMOPTIC) 0.5 % ophthalmic solution Administer 1 drop to both eyes 2 (two) times a day.     ALLERGIES:   Allergies   Allergen Reactions     Codeine      DIET: Regular.  Thrive ice cream.    Vitals:    06/18/19 1638   BP: 118/77   Pulse: 66   Resp: 18   Temp: (!) 95  F (35  C)   SpO2: 94%   Weight: (!) 95 lb 9.6 oz (43.4 kg)   Height: 4' 10\" (1.473 m)   Admission " weight was 96.6 pounds.  After that, she put on weight but has been losing lately, dropping of 4.5 pounds earlier and now 5.2 pounds.    EXAMINATION:   General: Frail-appearing elderly female, sitting in a wheelchair, scooting around using her legs to propel her, in no apparent distress.  Head: Normocephalic and atraumatic.   Eyes: Deformed left pupil, but sclerae are clear.   ENT: Moist oral mucosa. She has her own teeth. No rhinorrhea or nasal discharge. There is significant/severe hearing loss, although she does seem to hear a bit better out of the right ear.   Cardiovascular: Regular rate and rhythm with a coarse 3/6 ANN MARIE at the LSB.  Respiratory: Lungs clear to auscultation bilaterally.   Abdomen: Soft and nontender.   Musculoskeletal/Extremities: Age-related degenerative joint disease. Significant dextrokyphoscoliosis and bilateral hallux valgus deformities with arthritic deformities also noted in the hands. No peripheral edema.  Integument: No rashes, clinically significant lesions, or skin breakdown.   Cognitive/Psychiatric: There are indications of mild dementia. Affect is euthymic.    DIAGNOSTICS:   Results for orders placed or performed in visit on 12/10/18   Basic Metabolic Panel   Result Value Ref Range    Sodium 142 136 - 145 mmol/L    Potassium 4.3 3.5 - 5.0 mmol/L    Chloride 113 (H) 98 - 107 mmol/L    CO2 22 22 - 31 mmol/L    Anion Gap, Calculation 7 5 - 18 mmol/L    Glucose 75 70 - 125 mg/dL    Calcium 9.4 8.5 - 10.5 mg/dL    BUN 30 (H) 8 - 28 mg/dL    Creatinine 0.71 0.60 - 1.10 mg/dL    GFR MDRD Af Amer >60 >60 mL/min/1.73m2    GFR MDRD Non Af Amer >60 >60 mL/min/1.73m2     Lab Results   Component Value Date    WBC 4.7 09/06/2018    HGB 11.0 (L) 09/06/2018    HCT 32.9 (L) 09/06/2018    MCV 90 09/06/2018     09/06/2018     CrCl cannot be calculated (Patient's most recent lab result is older than the maximum 5 days allowed.).  Lab Results   Component Value Date    TSH 2.42 06/10/2015     Lab  Results   Component Value Date    ALT 10 06/10/2015    AST 18 06/10/2015    ALKPHOS 69 06/10/2015    BILITOT 0.3 06/10/2015     ASSESSMENT/Plan:      ICD-10-CM    1. Late onset Alzheimer's disease without behavioral disturbance (mild) G30.1     F02.80    2. Presbycusis of both ears H91.13    3. Primary osteoarthritis involving multiple joints M15.0    4. Weight loss R63.4    5. Idiopathic hypotension I95.0    6. Constipation, unspecified constipation type K59.00    7. Iron deficiency anemia, unspecified iron deficiency anemia type D50.9    8. S/P dilatation of esophageal stricture Z98.890     Z87.19      CHANGES:  None.    CARE PLAN:    The care plan has been reviewed and all orders signed. Changes to care plan, if any, as noted. Otherwise, continue care plan of care.    The above has been created using voice recognition software.  Please be aware that this may unintentionally produce inaccuracies and/or nonsensical sentences.      Electronically signed by:  Braxton Messer CNP

## 2021-05-30 VITALS — WEIGHT: 110.4 LBS

## 2021-05-30 VITALS — WEIGHT: 112.4 LBS

## 2021-05-30 VITALS — WEIGHT: 111 LBS

## 2021-05-31 VITALS — WEIGHT: 111.6 LBS

## 2021-05-31 VITALS — WEIGHT: 110.6 LBS

## 2021-05-31 VITALS — WEIGHT: 110 LBS

## 2021-05-31 VITALS — WEIGHT: 109.2 LBS

## 2021-05-31 VITALS — WEIGHT: 110.2 LBS

## 2021-05-31 NOTE — PROGRESS NOTES
"[facility]    Facility:   [facility] [[id number]]   Code Status: DNR/DNI       Chief Complaint   Patient presents with     Review Of Multiple Medical Conditions     LT 8/26/19.       HPI:  Naina is a 89 y.o. female seen for routine physician [facility] follow up at [facility]. She has diagnoses of HTN, not currently on BP meds, anemia on iron, DJD, dementia. She has no disruptive behavioral issues, generally pleasantly confused. She has visual impairment and is on eye drops.     Today:  She seems pretty stable. No interim concerns since my last visit. No medication changes. She uses wheelchair for longer distances but still uses walker in her room. Has arthritis and variable pain. States \"if it weren't for the arthritis I would be out dancing\". She has not been ill recently with cough cold or congestion. No concerns per nursing. No recent falls and no open areas of skin.       Past Medical History:  Past Medical History:   Diagnosis Date     Cancer (H)     Anal     Constipation     Chronic     Dementia      Hypertension      Sciatica        Medications:  Current Outpatient Medications   Medication Sig     acetaminophen (TYLENOL) 325 MG tablet Take 650 mg by mouth 3 (three) times a day.      acetaminophen (TYLENOL) 325 MG tablet Take 650 mg by mouth every 4 (four) hours as needed for pain.     aspirin 81 MG EC tablet Take 81 mg by mouth daily.     bisacodyl (DULCOLAX) 10 mg suppository Insert 10 mg into the rectum daily as needed.     calcium, as carbonate, (TUMS) 200 mg calcium (500 mg) chewable tablet Chew 1 tablet 2 (two) times a day.     docusate sodium (COLACE) 100 MG capsule Take 100 mg by mouth once daily.     dorzolamide (TRUSOPT) 2 % ophthalmic solution Administer 1 drop to both eyes 2 (two) times a day.     ferrous sulfate 325 (65 FE) MG tablet Take 1 tablet by mouth daily with breakfast.      latanoprost (XALATAN) 0.005 % ophthalmic solution Administer 1 " drop to both eyes bedtime.     omeprazole (PRILOSEC) 20 MG capsule Take 20 mg by mouth daily.     polyethylene glycol (MIRALAX) 17 gram packet Take 17 g by mouth daily.      polyethylene glycol (MIRALAX) 17 gram packet Take 17 g by mouth daily as needed.     polyvinyl alcohol (LIQUIFILM TEARS) 1.4 % ophthalmic solution Administer 1 drop to both eyes every 2 (two) hours as needed for dry eyes.     timolol maleate (TIMOPTIC) 0.25 % ophthalmic solution Administer 1 drop to both eyes 2 (two) times a day.     timolol maleate (TIMOPTIC) 0.5 % ophthalmic solution Administer 1 drop to both eyes 2 (two) times a day.       Physical Exam:  General: Patient is alert, pleasant elderly female, no distress.   Vitals: /66, Temp 97.3, Pulse 72, RR 18, O2 sat 97% RA.  HEENT: Head is NCAT. Eyes show no injection or icterus. Nares negative. Oropharynx well hydrated.  Lungs: Clear bilaterally. No wheezes.  Cardiovascular: Regular rate and rhythm, systolic murmur.  Abdomen: Soft, no tenderness on exam. Bowel sounds present. No guarding rebound or rigidity.  Extremities: No edema is noted.  Musculoskeletal: Age related degen changes. Marked deformities of fingers.  Psych: Mood appears good.      Labs:  Lab Results   Component Value Date    WBC 4.7 09/06/2018    HGB 11.0 (L) 09/06/2018    HCT 32.9 (L) 09/06/2018    MCV 90 09/06/2018     09/06/2018     Results for orders placed or performed in visit on 12/10/18   Basic Metabolic Panel   Result Value Ref Range    Sodium 142 136 - 145 mmol/L    Potassium 4.3 3.5 - 5.0 mmol/L    Chloride 113 (H) 98 - 107 mmol/L    CO2 22 22 - 31 mmol/L    Anion Gap, Calculation 7 5 - 18 mmol/L    Glucose 75 70 - 125 mg/dL    Calcium 9.4 8.5 - 10.5 mg/dL    BUN 30 (H) 8 - 28 mg/dL    Creatinine 0.71 0.60 - 1.10 mg/dL    GFR MDRD Af Amer >60 >60 mL/min/1.73m2    GFR MDRD Non Af Amer >60 >60 mL/min/1.73m2       Assessment/Plan:  1. Debility. Advanced age, DJD. Uses wheelchair more than walker.   2.  HTN. BPs overall acceptable, not on medications.  3. Anemia. She continues on iron supplement. Last hgb was 11.   4. Cognitive decline. Pleasant and forgetful.     Overall she appears stable, no new orders today.        Electronically signed by: Sandra Wing MD

## 2021-06-01 ENCOUNTER — RECORDS - HEALTHEAST (OUTPATIENT)
Dept: ADMINISTRATIVE | Facility: CLINIC | Age: 86
End: 2021-06-01

## 2021-06-01 VITALS — WEIGHT: 106.4 LBS

## 2021-06-01 NOTE — TELEPHONE ENCOUNTER
[facility] Nurse Triage Telephone Note      Provider: HAJA Chaidez  Facility: [facility] Type: LTC    Caller: Luzmaria  Call Back Number:  [phone]    Allergies: Codeine    Reason for call: Nurse calling to report that patient has been having auditory hallucinations for a while now.  She was seen by the house psychologist who recommends antipsychotics, however patient isn't taking her meds.  She's also refusing cares, food, and fluids.  The nurse had a difficult time even getting VS on the patient.  VS:  T=95.6 tympanic, P=62, R=17, BP= 118/59, F1=124%RA.      Verbal Order/Direction given by Provider: Send to ER due to altered mental status, refusal of cares/food/fluids, auditory hallucinations, and hypothermia.      Provider giving order: HAJA Chaidez    Verbal order given to: Luzmaria Boswell RN

## 2021-06-02 VITALS — WEIGHT: 102 LBS | BODY MASS INDEX: 21.41 KG/M2 | HEIGHT: 58 IN

## 2021-06-02 VITALS — HEIGHT: 58 IN | WEIGHT: 100.8 LBS | BODY MASS INDEX: 21.16 KG/M2

## 2021-06-02 NOTE — PROGRESS NOTES
[facility]      Facility:    [facility] [[id number]]  Code Status: DNR/DNI       Chief Complaint/Reason for Visit:  Chief Complaint   Patient presents with     H & P     Re-admit to LTC, on Hospice.       HPI:   Naina is a 89 y.o. female who resides at [facility]. She has diagnoses of HTN, not on BP meds, anemia on iron, DJD, dementia. She has no disruptive behavioral issues, generally pleasantly confused. She has visual impairment and is on eye drops. She was sent in to the hospital from nursing home on 9/25/19 due to increasing confusion. Her hospital info is noted below.    Hospital HPI:  Naina Lozada is a 89 y.o. old female with past medical history significant for dementia, advanced age, nursing home resident who presented for 1 day history of confusion, auditory hallucination, and hypothermia.  Upon evaluation in the ED, she does have confusion but vitals and labs are only remarkable for bradycardia and elevated BNP.  CT head, chest x-ray, and UA are unremarkable for acute changes.  No fevers, chills, chest pain, nausea, vomiting.  She received IV lorazepam 1 mg x 1 prior to transfer up to the floor and was sedated.      Hospital DC Summary;  89 y.o. old female with past medical history significant for dementia, advanced age, nursing home resident who presented for 1 day history of confusion, auditory hallucination, and hypothermia.  Upon evaluation in the ED,vitals and labs are only remarkable for bradycardia and elevated BNP.  CT head, chest x-ray, and UA are unremarkable for acute changes.  No fevers, chills, chest pain, nausea, vomiting.  She received IV lorazepam 1 mg x 1 prior to transfer up to the floor and was sedated.  Palliative care has evaluated hospice consulted hospice has spoken with nephew who opted for hospice care . Pt will be discharged to NH with hospice care.      Returned to nursing home on 9/27/19.    Today:  Unable to obtain  any info from patient at this time. She has had behaviors with agitation, anxiety. Currently on scheduled and prn haldol and scheduled and prn dilaudid. Obtunded now, nurses report not eating much, taking just a little water. She did have a chocolate shake yesterday. She is on comfort meds only though she wasn't on much for routine meds even prior to hospitalization. She seems comfortable at this time. Coughing noted when awake, has atropine for secretions. Using nicole chair. Bowels monitored. Urine output decreased.        Past Medical History:  Past Medical History:   Diagnosis Date     Cancer (H)     Anal     Constipation     Chronic     Dementia      Hypertension      Sciatica            Surgical History:  No past surgical history on file.  Unable to obtain secondary to dementia, obtunded condition.     Family History:   No family history on file.  Unable to obtain secondary to dementia and obtunded condition.    Social History:    Social History     Socioeconomic History     Marital status:      Spouse name: Not on file     Number of children: Not on file     Years of education: Not on file     Highest education level: Not on file   Occupational History     Not on file   Social Needs     Financial resource strain: Not on file     Food insecurity:     Worry: Not on file     Inability: Not on file     Transportation needs:     Medical: Not on file     Non-medical: Not on file   Tobacco Use     Smoking status: Never Smoker     Smokeless tobacco: Never Used   Substance and Sexual Activity     Alcohol use: No     Drug use: No     Sexual activity: Not on file   Lifestyle     Physical activity:     Days per week: Not on file     Minutes per session: Not on file     Stress: Not on file   Relationships     Social connections:     Talks on phone: Not on file     Gets together: Not on file     Attends Gnosticist service: Not on file     Active member of club or organization: Not on file     Attends meetings of clubs  or organizations: Not on file     Relationship status: Not on file     Intimate partner violence:     Fear of current or ex partner: Not on file     Emotionally abused: Not on file     Physically abused: Not on file     Forced sexual activity: Not on file   Other Topics Concern     Not on file   Social History Narrative     Not on file        Review of Systems:  Pertinent items are noted in HPI.   Unable to obtain secondary to dementia and obtunded condition.       Physical Exam:   General: Patient is resting in nicole chair, obtunded, not awakened. Cachectic.  Vitals: /67, Temp 96.5, Pulse 75, RR 16, O2 sat 97% RA.  HEENT: Head is NCAT. Eyes show no injection or icterus. Nares negative. Oropharynx somewhat dry, open mouth breathing.  Neck: Supple. No tenderness or adenopathy. No JVD.  Lungs: Poor cooperation. Clear bilaterally. No wheezes.  Cardiovascular: Regular rate and rhythm, systolic murmur.  Back: No spinal or CVA tenderness.  Abdomen: Soft, no tenderness on exam. Bowel sounds present. No guarding rebound or rigidity.  : Deferred.  Extremities: No edema is noted.  Musculoskeletal: Degen changes.   Skin: Warm and dry.  Psych: Unable to assess.       Labs:  Lab Results   Component Value Date    WBC 6.0 09/25/2019    HGB 12.4 09/25/2019    HCT 38.6 09/25/2019    MCV 94 09/25/2019     09/27/2019     Results for orders placed or performed in visit on 12/10/18   Basic Metabolic Panel   Result Value Ref Range    Sodium 142 136 - 145 mmol/L    Potassium 4.3 3.5 - 5.0 mmol/L    Chloride 113 (H) 98 - 107 mmol/L    CO2 22 22 - 31 mmol/L    Anion Gap, Calculation 7 5 - 18 mmol/L    Glucose 75 70 - 125 mg/dL    Calcium 9.4 8.5 - 10.5 mg/dL    BUN 30 (H) 8 - 28 mg/dL    Creatinine 0.71 0.60 - 1.10 mg/dL    GFR MDRD Af Amer >60 >60 mL/min/1.73m2    GFR MDRD Non Af Amer >60 >60 mL/min/1.73m2       Assessment/Plan:  1. Alzheimer dementia. Recent hospitalization with notable rapid decline. On Hospice, comfort  meds in place. Agitated and anxious, restless at times, responding to medications for pain and sx control. Continue to follow closely.  2. HTN. Not on medications, no issues, will not be checked often due to end of life.  3. Anemia. Noted per records.   4. End of life cares.   5. Code status DNR/DNI/Hospice.         Electronically signed by: Sandra Wing MD

## 2021-06-03 VITALS
DIASTOLIC BLOOD PRESSURE: 90 MMHG | WEIGHT: 99.2 LBS | RESPIRATION RATE: 20 BRPM | BODY MASS INDEX: 20.73 KG/M2 | SYSTOLIC BLOOD PRESSURE: 133 MMHG | TEMPERATURE: 99.2 F | HEART RATE: 77 BPM | OXYGEN SATURATION: 98 %

## 2021-06-03 VITALS — BODY MASS INDEX: 20.07 KG/M2 | WEIGHT: 95.6 LBS | HEIGHT: 58 IN

## 2021-06-08 NOTE — PROGRESS NOTES
[facility]    Name:   Naina Lozada  : 1923  Facility:   [facility] [[id number]]   Room: 227  Code Status: DNR/DNI -   Fac type:   NF (Long Term Care, LTC) -     CHIEF COMPLAINT / REASON FOR VISIT:  Chief Complaint   Patient presents with     Review Of Multiple Medical Conditions    Patient was last seen by me on 16 and subsequently seen by Dr. Wing on 16.    HPI: Naina is a 89 y.o. female with mild dementia and severe hearing loss who is pretty much independent, ambulatory on her own, independent with bed mobility, and continent of both bowel and bladder. She tells me she exercises every day.    She previously had a diagnosis of hypertension and was on 12.5 mg of atenolol BID. When she began presenting consistently with systolics below 100, we discontinued her atenolol. Blood pressures continue to be on the low side with systolics typically below 100. Despite this, she denies any problems with dizziness.    She has chronic intermittent constipation but was recently noted to be having loose stools. She receives both scheduled Colace (100 mg QD) and MiraLAX. We changed her BID dosing of the latter to QD and QD PRN, and this is working well.     She also has a history of anal ulcers, as well as a history of urinary retention with incomplete bladder emptying (no current problems), and some right-sided sciatica, although she offers no complaints of that today nor has she during recent visits.     In 2015 she was evaluated by GI, undergoing an upper endoscopy and later a dilatation due to a esophageal stricture at the GE junction. Also noted were Ayush's erosions in a large hiatal hernia, and she is now receiving a proton pump inhibitor with iron supplementation to address her anemia. She was subsequently seen by Dr. Sin this past April due to recurrence of her swallowing difficulty. She confirmed that sometimes it took 2 or 3 tries to  "swallow food. When the dilatation was performed in March by Dr. Raghu Mills, he stated in his note that if swallowing difficulty recurred, a video was esophogram should be done with a follow-up in their esophageal clinic prior to further endoscopic dilatation. This was performed on 04/14/16. The image acquisition was compromised due to limited patient mobility, but what was noted was a moderate sized sliding hiatal hernia with large volume of gastroesophageal reflux, a Schatzki's ring, and small cervical esophageal diverticulum but no significant stricture. Currently, she tells me everything is all right, but she needs to be careful, making sure that what she eats is in small pieces.    She has arthritis and some stiffness, particularly in the morning, but she rarely complains and seemed to be doing well on 650 mg of acetaminophen in the morning and 325 mg in the evening, although we increased the evening dose to 650 mg per nursing request. Despite her arthritis, she tells me, \"I keep active anyway.\" No complaints of headaches, chest pain, nausea or vomiting, coughing or congestion, dizziness or dyspnea, dysuria, integumentary issues, problems with sleep, or feelings of depression or anxiety.    Past Medical History   Diagnosis Date     Cancer      Anal     Constipation      Chronic     Dementia      Hypertension      Sciatica              No family history on file.    MEDICATIONS: Reviewed from the MAR, physician orders, and earlier progress notes. Updated today (10/27/16) with an increase in her bedtime acetaminophen and discontinuation of atenolol reflected below.  Current Outpatient Prescriptions   Medication Sig     polyethylene glycol (MIRALAX) 17 gram packet Take 17 g by mouth daily as needed.     acetaminophen (TYLENOL) 325 MG tablet Take 650 mg by mouth every morning.     acetaminophen (TYLENOL) 325 MG tablet Take 650 mg by mouth bedtime.      acetaminophen (TYLENOL) 325 MG tablet Take 650 mg by mouth " every 4 (four) hours as needed for pain.     aspirin 81 mg chewable tablet Chew 81 mg every morning.     bisacodyl (DULCOLAX) 10 mg suppository Insert 10 mg into the rectum daily as needed.     calcium, as carbonate, (TUMS) 200 mg calcium (500 mg) chewable tablet Chew 1 tablet 2 (two) times a day.     docusate sodium (COLACE) 100 MG capsule Take 100 mg by mouth once daily.     dorzolamide-timolol (COSOPT) 22.3-6.8 mg/mL ophthalmic solution Administer 1 drop to both eyes 2 (two) times a day.     ferrous sulfate 325 (65 FE) MG tablet Take 1 tablet by mouth 2 (two) times a day.      ipratropium-albuterol (DUO-NEB) 0.5-2.5 mg/3 mL nebulizer 3 mL 2 (two) times a day as needed.     latanoprost (XALATAN) 0.005 % ophthalmic solution Administer 1 drop to both eyes bedtime.     omeprazole (PRILOSEC) 20 MG capsule Take 20 mg by mouth daily.     polyethylene glycol (MIRALAX) 17 gram packet Take 17 g by mouth daily.      polyvinyl alcohol (LIQUIFILM TEARS) 1.4 % ophthalmic solution Administer 1 drop to both eyes every 2 (two) hours as needed for dry eyes.     ALLERGIES:   Allergies   Allergen Reactions     Codeine      DIET: Regular.    Vitals:    01/26/17 1245   BP: (!) 88/52   Pulse: 74   Resp: 18   Temp: (!) 95.9  F (35.5  C)   Weight: 112 lb 6.4 oz (51 kg)   Weight has been stable of late. Admission weight was 96.6 pounds.    EXAMINATION:   General: Pleasant, alert, and conversant elderly female, busying getting herself ready for breakfast, in no apparent distress. She is very upbeat and tells me she exercises every day. 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  Head: Normocephalic and atraumatic.   Eyes: Deformed left pupil, but sclerae are clear.   ENT: Moist oral mucosa. She has her own teeth. No rhinorrhea or nasal discharge. There is significant/severe hearing loss, although she does seem to hear a bit better out of the right ear.  Cardiovascular: Regular rate and rhythm. Coarse 3/6 ANN MARIE at the LSB.  Respiratory: Lungs clear to  auscultation bilaterally.   Abdomen: Soft and nontender.   Musculoskeletal/Extremities: Age-related degenerative joint disease. Significant dextrokyphoscoliosis and bilateral hallux valgus deformities with arthritic deformities also noted in the hands. No peripheral edema.  Integument: No rashes, clinically significant lesions, or skin breakdown.   Cognitive/Psychiatric: There are indications of mild dementia. Affect is euthymic.    DIAGNOSTICS:   Results for orders placed in visit on 08/21/14   BASIC METABOLIC PANEL       Result Value Ref Range    Sodium 142  136-145 mmol/L    Potassium 3.9  3.5-5.0 mmol/L    Chloride 110 (*)  mmol/L    CO2 21 (*) 22-31 mmol/L    Anion Gap, Calculation 11  5-18 mmol/L    Glucose 95   mg/dL    Calcium 9.6  8.5-10.5 mg/dL    BUN 28  8-28 mg/dL    Creatinine 0.72  0.60-1.10 mg/dL    GFR MDRD Af Amer >60  >60 mL/min/1.73m2    GFR MDRD Non Af Amer >60  >60 mL/min/1.73m2     Lab Results   Component Value Date    WBC 5.8 08/23/2016    HGB 11.4 (L) 08/23/2016    HCT 36.0 08/23/2016    MCV 94 08/23/2016     08/23/2016     CrCl cannot be calculated (Unknown ideal weight.).  Lab Results   Component Value Date    TSH 2.42 06/10/2015     Lab Results   Component Value Date    ALT 10 06/10/2015    AST 18 06/10/2015    ALKPHOS 69 06/10/2015    BILITOT 0.3 06/10/2015     ASSESSMENT/Plan:      ICD-10-CM    1. Late onset Alzheimer's disease without behavioral disturbance G30.1     F02.80    2. Presbycusis of both ears H91.13    3. Idiopathic hypotension I95.0    4. Constipation K59.00    5. Osteoarthritis M19.90    6. Anemia, unspecified type D64.9      CHANGES:  None.    CARE PLAN:    The care plan has been reviewed and all orders signed. Changes to care plan, if any, as noted. Otherwise, continue care plan of care.      Electronically signed by: Braxton Messer CNP

## 2021-06-09 NOTE — PROGRESS NOTES
[facility]    Facility:   [facility] [[id number]]   Code Status: DNR/DNI       Chief Complaint   Patient presents with     Review Of Multiple Medical Conditions       HPI:  Naina is a 89 y.o. female seen today in routine follow-up visit for multiple medical concerns. She has anemia, hemoglobin, which was as low as 7.1 in February, has risen to a most recent 11.1. She is on Iron replacement. She states her appetite is good, sleeping is good, she does not feel pain when she is at rest, she states her vision is good and her hearing is improved with her new hearing aids, she has no shortness of breath or other breathing difficulties, has had no recent nausea or abdominal discomfort, bowel and bladder function are good, she is good mobility and is not had any recent falls or near falls. She has a diagnosis of hypertension, not currently on BP meds. She has arthritis and some stiffness, particularly in the morning, but she rarely complains and seemed to be doing well on acetaminophen. No complaints of headaches, chest pain, nausea or vomiting, coughing or congestion, dizziness or dyspnea, dysuria, integumentary issues, problems with sleep, or feelings of depression or anxiety.      Past Medical History:  Past Medical History:   Diagnosis Date     Cancer     Anal     Constipation     Chronic     Dementia      Hypertension      Sciatica        Medications:  Most accurate list exists at the [facility] facility.  Current Outpatient Prescriptions   Medication Sig     acetaminophen (TYLENOL) 325 MG tablet Take 650 mg by mouth every morning.     acetaminophen (TYLENOL) 325 MG tablet Take 650 mg by mouth bedtime.      acetaminophen (TYLENOL) 325 MG tablet Take 650 mg by mouth every 4 (four) hours as needed for pain.     aspirin 81 mg chewable tablet Chew 81 mg every morning.     bisacodyl (DULCOLAX) 10 mg suppository Insert 10 mg into the rectum daily as needed.     calcium, as carbonate,  (TUMS) 200 mg calcium (500 mg) chewable tablet Chew 1 tablet 2 (two) times a day.     docusate sodium (COLACE) 100 MG capsule Take 100 mg by mouth once daily.     dorzolamide-timolol (COSOPT) 22.3-6.8 mg/mL ophthalmic solution Administer 1 drop to both eyes 2 (two) times a day.     ferrous sulfate 325 (65 FE) MG tablet Take 1 tablet by mouth 2 (two) times a day.      ipratropium-albuterol (DUO-NEB) 0.5-2.5 mg/3 mL nebulizer 3 mL 2 (two) times a day as needed.     latanoprost (XALATAN) 0.005 % ophthalmic solution Administer 1 drop to both eyes bedtime.     omeprazole (PRILOSEC) 20 MG capsule Take 20 mg by mouth daily.     polyethylene glycol (MIRALAX) 17 gram packet Take 17 g by mouth daily.      polyethylene glycol (MIRALAX) 17 gram packet Take 17 g by mouth daily as needed.     polyvinyl alcohol (LIQUIFILM TEARS) 1.4 % ophthalmic solution Administer 1 drop to both eyes every 2 (two) hours as needed for dry eyes.         Physical Exam:  General: Patient is alert, pleasant elderly female, no distress.   HEENT: Head is NCAT. Eyes show no injection or icterus. Nares negative. Oropharynx well hydrated.  Neck: Supple. No tenderness or adenopathy. No JVD.  Lungs: Clear bilaterally. No wheezes.  Cardiovascular: Regular rate and rhythm, normal S1, S2.  Back: No spinal or CVA tenderness.  Abdomen: Soft, no tenderness on exam. Bowel sounds present. No guarding rebound or rigidity.  : Deferred.  Extremities: No edema is noted.  Musculoskeletal: Age related degen changes.  Psych: Mood appears good.      Assessment/Plan:  1. HTN. BPs well controlled, not on current meds.  2. Anemia. Mild.  3. DJD. Pain manageable.  4. Dementia. No disruptive behaviors.    Overall she appears stable, no new orders today.      Electronically signed by: Sandra Wing MD

## 2021-06-09 NOTE — PROGRESS NOTES
[facility]    Name:   Naina Lozada  : 1923  Facility:   [facility] [[id number]]   Room: 227  Code Status: DNR/DNI -   Fac type:   NF (Long Term Care, LTC) -     CHIEF COMPLAINT / REASON FOR VISIT:  Chief Complaint   Patient presents with     Review Of Multiple Medical Conditions    Patient was last seen by me on 16 and subsequently seen by Dr. Wing on 16.    HPI: Naina is a very pleasant 89 y.o. female with mild dementia and severe hearing loss who is pretty much independent, ambulatory without human assistance, independent with bed mobility, and continent of both bowel and bladder. She tells me she exercises every day. She asks me if there is anything to straighten her severely kyphotic back.    She previously had a diagnosis of hypertension and was on 12.5 mg of atenolol BID. When she began presenting consistently with systolics below 100, we discontinued her atenolol. Blood pressures continue to be on the low side with systolics typically below 100. Despite this, she denies any problems with dizziness. 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    She has chronic intermittent constipation but was recently noted to be having loose stools. She receives both scheduled Colace (100 mg QD) and MiraLAX (QD and QD PRN). This is working well.     She also has a history of anal ulcers, as well as a history of urinary retention with incomplete bladder emptying (no current problems), and some right-sided sciatica, although she offers no complaints of that today nor has she during several recent visits.     In 2015 she was evaluated by GI, undergoing an upper endoscopy and later a dilatation due to a esophageal stricture at the GE junction. Also noted were Ayush's erosions in a large hiatal hernia, and she is now receiving a proton pump inhibitor with iron supplementation to address her anemia. She was subsequently seen by Dr. Sin  "this past April due to recurrence of her swallowing difficulty. She confirmed that sometimes it took 2 or 3 tries to swallow food. When the dilatation was performed in March by Dr. Raghu Mills, he stated in his note that if swallowing difficulty recurred, a video was esophogram should be done with a follow-up in their esophageal clinic prior to further endoscopic dilatation. This was performed on 04/14/16. The image acquisition was compromised due to limited patient mobility, but what was noted was a moderate sized sliding hiatal hernia with large volume of gastroesophageal reflux, a Schatzki's ring, and small cervical esophageal diverticulum but no significant stricture. Currently, she tells me everything is all right, but she needs to be careful, making sure that what she eats is in small pieces.    She has arthritis and some stiffness all over, particularly in the morning, but she rarely complains and seemed to be doing well on 650 mg of acetaminophen in the morning and 325 mg in the evening, although we increased the evening dose to 650 mg per nursing request. Despite her arthritis, she tells me, \"I keep active anyway.\" No complaints of headaches, chest pain, nausea or vomiting, coughing or congestion, dizziness or dyspnea, dysuria, integumentary issues, problems with sleep, or feelings of depression or anxiety.    Past Medical History:   Diagnosis Date     Cancer     Anal     Constipation     Chronic     Dementia      Hypertension      Sciatica              No family history on file.    MEDICATIONS: Reviewed from the MAR, physician orders, and earlier progress notes. Updated today (10/27/16) with an increase in her bedtime acetaminophen and discontinuation of atenolol reflected below.  Current Outpatient Prescriptions   Medication Sig     acetaminophen (TYLENOL) 325 MG tablet Take 650 mg by mouth every morning.     acetaminophen (TYLENOL) 325 MG tablet Take 650 mg by mouth bedtime.      acetaminophen " (TYLENOL) 325 MG tablet Take 650 mg by mouth every 4 (four) hours as needed for pain.     aspirin 81 mg chewable tablet Chew 81 mg every morning.     bisacodyl (DULCOLAX) 10 mg suppository Insert 10 mg into the rectum daily as needed.     calcium, as carbonate, (TUMS) 200 mg calcium (500 mg) chewable tablet Chew 1 tablet 2 (two) times a day.     docusate sodium (COLACE) 100 MG capsule Take 100 mg by mouth once daily.     dorzolamide-timolol (COSOPT) 22.3-6.8 mg/mL ophthalmic solution Administer 1 drop to both eyes 2 (two) times a day.     ferrous sulfate 325 (65 FE) MG tablet Take 1 tablet by mouth 2 (two) times a day.      ipratropium-albuterol (DUO-NEB) 0.5-2.5 mg/3 mL nebulizer 3 mL 2 (two) times a day as needed.     latanoprost (XALATAN) 0.005 % ophthalmic solution Administer 1 drop to both eyes bedtime.     omeprazole (PRILOSEC) 20 MG capsule Take 20 mg by mouth daily.     polyethylene glycol (MIRALAX) 17 gram packet Take 17 g by mouth daily.      polyethylene glycol (MIRALAX) 17 gram packet Take 17 g by mouth daily as needed.     polyvinyl alcohol (LIQUIFILM TEARS) 1.4 % ophthalmic solution Administer 1 drop to both eyes every 2 (two) hours as needed for dry eyes.     ALLERGIES:   Allergies   Allergen Reactions     Codeine      DIET: Regular.    Vitals:    02/23/17 1527   BP: 91/55   Pulse: 61   Resp: 18   Temp: 97.3  F (36.3  C)   Weight: 110 lb 6.4 oz (50.1 kg)   Weight has been stable of late. Admission weight was 96.6 pounds.    EXAMINATION:   General: Pleasant, alert, and conversant elderly female, busying getting herself ready for breakfast, in no apparent distress. She is very upbeat and tells me she exercises every day. 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  Head: Normocephalic and atraumatic.   Eyes: Deformed left pupil, but sclerae are clear.   ENT: Moist oral mucosa. She has her own teeth. No rhinorrhea or nasal discharge. There is significant/severe hearing loss, although she does seem to hear a bit  better out of the right ear.  Cardiovascular: Regular rate and rhythm. Coarse 3/6 ANN MARIE at the LSB.  Respiratory: Lungs clear to auscultation bilaterally.   Abdomen: Soft and nontender.   Musculoskeletal/Extremities: Age-related degenerative joint disease. Significant dextrokyphoscoliosis and bilateral hallux valgus deformities with arthritic deformities also noted in the hands. No peripheral edema.  Integument: No rashes, clinically significant lesions, or skin breakdown.   Cognitive/Psychiatric: There are indications of mild dementia. Affect is euthymic.    DIAGNOSTICS:   Results for orders placed in visit on 08/21/14   BASIC METABOLIC PANEL       Result Value Ref Range    Sodium 142  136-145 mmol/L    Potassium 3.9  3.5-5.0 mmol/L    Chloride 110 (*)  mmol/L    CO2 21 (*) 22-31 mmol/L    Anion Gap, Calculation 11  5-18 mmol/L    Glucose 95   mg/dL    Calcium 9.6  8.5-10.5 mg/dL    BUN 28  8-28 mg/dL    Creatinine 0.72  0.60-1.10 mg/dL    GFR MDRD Af Amer >60  >60 mL/min/1.73m2    GFR MDRD Non Af Amer >60  >60 mL/min/1.73m2     Lab Results   Component Value Date    WBC 5.8 08/23/2016    HGB 11.4 (L) 08/23/2016    HCT 36.0 08/23/2016    MCV 94 08/23/2016     08/23/2016     CrCl cannot be calculated (Unknown ideal weight.).  Lab Results   Component Value Date    TSH 2.42 06/10/2015     Lab Results   Component Value Date    ALT 10 06/10/2015    AST 18 06/10/2015    ALKPHOS 69 06/10/2015    BILITOT 0.3 06/10/2015     ASSESSMENT/Plan:      ICD-10-CM    1. Late onset Alzheimer's disease without behavioral disturbance G30.1     F02.80    2. Presbycusis of both ears H91.13    3. Idiopathic hypotension I95.0    4. Constipation K59.00    5. Osteoarthritis M19.90    6. Anemia, unspecified type D64.9      CHANGES:  None.    CARE PLAN:    The care plan has been reviewed and all orders signed. Changes to care plan, if any, as noted. Otherwise, continue care plan of care.      Electronically signed by: Braxton  Bhavik Messer, CNP

## 2021-06-10 NOTE — PROGRESS NOTES
[facility]    Name:   Naina Lozada  : 1923  Facility:   [facility] [[id number]]   Room: 227  Code Status: DNR/DNI -   Fac type:   NF (Long Term Care, LTC) -     CHIEF COMPLAINT / REASON FOR VISIT:  Chief Complaint   Patient presents with     Review Of Multiple Medical Conditions    Patient was last seen by me on 17.    HPI: Naina is a very pleasant 89 y.o. female with mild dementia (without behavioral disturbance) and severe hearing loss, who is pretty much independent, ambulatory with a walker and without human assistance, independent with bed mobility, and continent of both bowel and bladder. She tells me she exercises every day. She asks me if there is anything to straighten her severely kyphotic back.    She previously had a diagnosis of hypertension and was on 12.5 mg of atenolol BID. When she began presenting consistently with systolics below 100, we discontinued her atenolol. Blood pressures continue to be on the low side with systolics typically below 100, although today her blood pressure is normal. Despite this, she denies any problems with dizziness. 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    She has chronic intermittent constipation, but may have occasional loose stools. She receives both scheduled Colace (100 mg QD) and MiraLAX (QD and QD PRN). This is working well.     She also has a history of anal ulcers, as well as a history of urinary retention with incomplete bladder emptying (no current problems), and some right-sided sciatica, although she offers no complaints of that today nor has she during several recent visits.     In 2015 she was evaluated by GI, undergoing an upper endoscopy and later a dilatation due to a esophageal stricture at the GE junction. Also noted were Ayush's erosions in a large hiatal hernia, and she is now receiving a proton pump inhibitor with iron supplementation to address her anemia. She  "was subsequently seen by Dr. Sin in April 2016 due to recurrence of her swallowing difficulty. She confirmed that sometimes it took 2 or 3 tries to swallow food. When the dilatation was performed one month earlier by Dr. Raghu Mills, he stated in his note that if swallowing difficulty recurred, a video was esophogram should be done with a follow-up in their esophageal clinic prior to further endoscopic dilatation. This was performed on 04/14/16. The image acquisition was compromised due to limited patient mobility, but what was noted was a moderate sized sliding hiatal hernia with large volume of gastroesophageal reflux, a Schatzki's ring, and small cervical esophageal diverticulum but no significant stricture. Currently, she tells me everything is all right, but she needs to be careful, making sure that what she eats is in small pieces.    She has arthritis and some stiffness all over, particularly in the morning, but she rarely complains and seemed to be doing well on 650 mg of acetaminophen in the morning and 650 mg in the evening. Despite her arthritis, she typically tells me, \"I keep active anyway.\" No complaints of headaches, chest pain, nausea or vomiting, coughing or congestion, dizziness or dyspnea, dysuria, integumentary issues, problems with sleep, or feelings of depression or anxiety.    Past Medical History:   Diagnosis Date     Cancer     Anal     Constipation     Chronic     Dementia      Hypertension      Sciatica              No family history on file.    MEDICATIONS: Reviewed from the MAR, physician orders, and earlier progress notes.  below.  Current Outpatient Prescriptions   Medication Sig     acetaminophen (TYLENOL) 325 MG tablet Take 650 mg by mouth every morning.     acetaminophen (TYLENOL) 325 MG tablet Take 650 mg by mouth bedtime.      acetaminophen (TYLENOL) 325 MG tablet Take 650 mg by mouth every 4 (four) hours as needed for pain.     aspirin 81 mg chewable tablet Chew 81 mg every " morning.     bisacodyl (DULCOLAX) 10 mg suppository Insert 10 mg into the rectum daily as needed.     calcium, as carbonate, (TUMS) 200 mg calcium (500 mg) chewable tablet Chew 1 tablet 2 (two) times a day.     docusate sodium (COLACE) 100 MG capsule Take 100 mg by mouth once daily.     dorzolamide-timolol (COSOPT) 22.3-6.8 mg/mL ophthalmic solution Administer 1 drop to both eyes 2 (two) times a day.     ferrous sulfate 325 (65 FE) MG tablet Take 1 tablet by mouth 2 (two) times a day.      latanoprost (XALATAN) 0.005 % ophthalmic solution Administer 1 drop to both eyes bedtime.     omeprazole (PRILOSEC) 20 MG capsule Take 20 mg by mouth daily.     polyethylene glycol (MIRALAX) 17 gram packet Take 17 g by mouth daily.      polyethylene glycol (MIRALAX) 17 gram packet Take 17 g by mouth daily as needed.     polyvinyl alcohol (LIQUIFILM TEARS) 1.4 % ophthalmic solution Administer 1 drop to both eyes every 2 (two) hours as needed for dry eyes.     ALLERGIES:   Allergies   Allergen Reactions     Codeine      DIET: Regular.    Vitals:    05/11/17 1246   BP: 106/52   Pulse: 61   Resp: 18   Temp: 96.7  F (35.9  C)   Weight: 110 lb 6.4 oz (50.1 kg)   Weight has been very stable of late. Admission weight was 96.6 pounds.    EXAMINATION:   General: Pleasant, alert, and conversant elderly female, ambulating independently down the hallway with her rolling walker, in no apparent distress. 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  Head: Normocephalic and atraumatic.   Eyes: Deformed left pupil, but sclerae are clear.   ENT: Moist oral mucosa. She has her own teeth. No rhinorrhea or nasal discharge. There is significant/severe hearing loss, although she does seem to hear a bit better out of the right ear.  Cardiovascular: Regular rate and rhythm with a coarse 3/6 ANN MARIE at the LSB.  Respiratory: Lungs clear to auscultation bilaterally.   Abdomen: Soft and nontender.   Musculoskeletal/Extremities: Age-related degenerative joint  disease. Significant dextrokyphoscoliosis and bilateral hallux valgus deformities with arthritic deformities also noted in the hands. No peripheral edema.  Integument: No rashes, clinically significant lesions, or skin breakdown.   Cognitive/Psychiatric: There are indications of mild dementia. Affect is euthymic.    DIAGNOSTICS:   Results for orders placed in visit on 08/21/14   BASIC METABOLIC PANEL       Result Value Ref Range    Sodium 142  136-145 mmol/L    Potassium 3.9  3.5-5.0 mmol/L    Chloride 110 (*)  mmol/L    CO2 21 (*) 22-31 mmol/L    Anion Gap, Calculation 11  5-18 mmol/L    Glucose 95   mg/dL    Calcium 9.6  8.5-10.5 mg/dL    BUN 28  8-28 mg/dL    Creatinine 0.72  0.60-1.10 mg/dL    GFR MDRD Af Amer >60  >60 mL/min/1.73m2    GFR MDRD Non Af Amer >60  >60 mL/min/1.73m2     Lab Results   Component Value Date    WBC 5.8 08/23/2016    HGB 11.4 (L) 08/23/2016    HCT 36.0 08/23/2016    MCV 94 08/23/2016     08/23/2016     CrCl cannot be calculated (Unknown ideal weight.).  Lab Results   Component Value Date    TSH 2.42 06/10/2015     Lab Results   Component Value Date    ALT 10 06/10/2015    AST 18 06/10/2015    ALKPHOS 69 06/10/2015    BILITOT 0.3 06/10/2015     ASSESSMENT/Plan:      ICD-10-CM    1. Late onset Alzheimer's disease without behavioral disturbance G30.1     F02.80    2. Presbycusis of both ears H91.13    3. Idiopathic hypotension I95.0    4. Constipation, unspecified constipation type K59.00    5. Osteoarthritis M19.90    6. S/P dilatation of esophageal stricture Z98.890     Z87.19      CHANGES:  None.    CARE PLAN:    The care plan has been reviewed and all orders signed. Changes to care plan, if any, as noted. Otherwise, continue care plan of care.      Electronically signed by: Braxton Messer, DAISY

## 2021-06-10 NOTE — PROGRESS NOTES
[facility]    Name:   Naina Lozada  : 1923  Facility:   [facility] [[id number]]   Room: 227  Code Status: DNR/DNI -   Fac type:   NF (Long Term Care, LTC) -     CHIEF COMPLAINT / REASON FOR VISIT:  Chief Complaint   Patient presents with     Review Of Multiple Medical Conditions    Patient was last seen by me on 17 and subsequently seen by Dr. Wing on 17.    HPI: Naina is a very pleasant 89 y.o. female with mild dementia (without behavioral disturbance) and severe hearing loss, who is pretty much independent, ambulatory with a walker and without human assistance, independent with bed mobility, and continent of both bowel and bladder. She tells me she exercises every day. She asks me if there is anything to straighten her severely kyphotic back.    She previously had a diagnosis of hypertension and was on 12.5 mg of atenolol BID. When she began presenting consistently with systolics below 100, we discontinued her atenolol. Blood pressures continue to be on the low side with systolics typically below 100, although today her blood pressure is normal. Despite this, she denies any problems with dizziness. 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    She has chronic intermittent constipation, but may have occasional loose stools. She receives both scheduled Colace (100 mg QD) and MiraLAX (QD and QD PRN). This is working well.     She also has a history of anal ulcers, as well as a history of urinary retention with incomplete bladder emptying (no current problems), and some right-sided sciatica, although she offers no complaints of that today nor has she during several recent visits.     In 2015 she was evaluated by GI, undergoing an upper endoscopy and later a dilatation due to a esophageal stricture at the GE junction. Also noted were Ayush's erosions in a large hiatal hernia, and she is now receiving a proton pump inhibitor with  "iron supplementation to address her anemia. She was subsequently seen by Dr. Sin this past April due to recurrence of her swallowing difficulty. She confirmed that sometimes it took 2 or 3 tries to swallow food. When the dilatation was performed in March by Dr. Raghu Mills, he stated in his note that if swallowing difficulty recurred, a video was esophogram should be done with a follow-up in their esophageal clinic prior to further endoscopic dilatation. This was performed on 04/14/16. The image acquisition was compromised due to limited patient mobility, but what was noted was a moderate sized sliding hiatal hernia with large volume of gastroesophageal reflux, a Schatzki's ring, and small cervical esophageal diverticulum but no significant stricture. Currently, she tells me everything is all right, but she needs to be careful, making sure that what she eats is in small pieces.    She has arthritis and some stiffness all over, particularly in the morning, but she rarely complains and seemed to be doing well on 650 mg of acetaminophen in the morning and 650 mg in the evening. Despite her arthritis, she tells me, \"I keep active anyway.\" No complaints of headaches, chest pain, nausea or vomiting, coughing or congestion, dizziness or dyspnea, dysuria, integumentary issues, problems with sleep, or feelings of depression or anxiety.    Past Medical History:   Diagnosis Date     Cancer     Anal     Constipation     Chronic     Dementia      Hypertension      Sciatica              No family history on file.    MEDICATIONS: Reviewed from the MAR, physician orders, and earlier progress notes.  below.  Current Outpatient Prescriptions   Medication Sig     acetaminophen (TYLENOL) 325 MG tablet Take 650 mg by mouth every morning.     acetaminophen (TYLENOL) 325 MG tablet Take 650 mg by mouth bedtime.      acetaminophen (TYLENOL) 325 MG tablet Take 650 mg by mouth every 4 (four) hours as needed for pain.     aspirin 81 mg " chewable tablet Chew 81 mg every morning.     bisacodyl (DULCOLAX) 10 mg suppository Insert 10 mg into the rectum daily as needed.     calcium, as carbonate, (TUMS) 200 mg calcium (500 mg) chewable tablet Chew 1 tablet 2 (two) times a day.     docusate sodium (COLACE) 100 MG capsule Take 100 mg by mouth once daily.     dorzolamide-timolol (COSOPT) 22.3-6.8 mg/mL ophthalmic solution Administer 1 drop to both eyes 2 (two) times a day.     ferrous sulfate 325 (65 FE) MG tablet Take 1 tablet by mouth 2 (two) times a day.      latanoprost (XALATAN) 0.005 % ophthalmic solution Administer 1 drop to both eyes bedtime.     omeprazole (PRILOSEC) 20 MG capsule Take 20 mg by mouth daily.     polyethylene glycol (MIRALAX) 17 gram packet Take 17 g by mouth daily.      polyethylene glycol (MIRALAX) 17 gram packet Take 17 g by mouth daily as needed.     polyvinyl alcohol (LIQUIFILM TEARS) 1.4 % ophthalmic solution Administer 1 drop to both eyes every 2 (two) hours as needed for dry eyes.     ALLERGIES:   Allergies   Allergen Reactions     Codeine      DIET: Regular.    Vitals:    04/27/17 1240   BP: 124/63   Pulse: 63   Resp: 20   Temp: 96.6  F (35.9  C)   Weight: 111 lb (50.3 kg)   Weight has been stable of late. Admission weight was 96.6 pounds.    EXAMINATION:   General: Pleasant, alert, and conversant elderly female, busying getting herself ready for breakfast, in no apparent distress. She is very upbeat and tells me she exercises every day. 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  Head: Normocephalic and atraumatic.   Eyes: Deformed left pupil, but sclerae are clear.   ENT: Moist oral mucosa. She has her own teeth. No rhinorrhea or nasal discharge. There is significant/severe hearing loss, although she does seem to hear a bit better out of the right ear.  Cardiovascular: Regular rate and rhythm. Coarse 3/6 ANN MARIE at the LSB.  Respiratory: Lungs clear to auscultation bilaterally.   Abdomen: Soft and nontender.    Musculoskeletal/Extremities: Age-related degenerative joint disease. Significant dextrokyphoscoliosis and bilateral hallux valgus deformities with arthritic deformities also noted in the hands. No peripheral edema.  Integument: No rashes, clinically significant lesions, or skin breakdown.   Cognitive/Psychiatric: There are indications of mild dementia. Affect is euthymic.    DIAGNOSTICS:   Results for orders placed in visit on 08/21/14   BASIC METABOLIC PANEL       Result Value Ref Range    Sodium 142  136-145 mmol/L    Potassium 3.9  3.5-5.0 mmol/L    Chloride 110 (*)  mmol/L    CO2 21 (*) 22-31 mmol/L    Anion Gap, Calculation 11  5-18 mmol/L    Glucose 95   mg/dL    Calcium 9.6  8.5-10.5 mg/dL    BUN 28  8-28 mg/dL    Creatinine 0.72  0.60-1.10 mg/dL    GFR MDRD Af Amer >60  >60 mL/min/1.73m2    GFR MDRD Non Af Amer >60  >60 mL/min/1.73m2     Lab Results   Component Value Date    WBC 5.8 08/23/2016    HGB 11.4 (L) 08/23/2016    HCT 36.0 08/23/2016    MCV 94 08/23/2016     08/23/2016     CrCl cannot be calculated (Unknown ideal weight.).  Lab Results   Component Value Date    TSH 2.42 06/10/2015     Lab Results   Component Value Date    ALT 10 06/10/2015    AST 18 06/10/2015    ALKPHOS 69 06/10/2015    BILITOT 0.3 06/10/2015     ASSESSMENT/Plan:      ICD-10-CM    1. Late onset Alzheimer's disease without behavioral disturbance G30.1     F02.80    2. Presbycusis of both ears H91.13    3. Idiopathic hypotension I95.0    4. Constipation, unspecified constipation type K59.00    5. Osteoarthritis M19.90      CHANGES:  None.    CARE PLAN:    The care plan has been reviewed and all orders signed. Changes to care plan, if any, as noted. Otherwise, continue care plan of care.      Electronically signed by: Braxton Messer, DAISY

## 2021-06-11 NOTE — PROGRESS NOTES
[facility]    Facility:   [facility] [[id number]]   Code Status: DNR/DNI       Chief Complaint   Patient presents with     Review Of Multiple Medical Conditions     LTC 6/30/17.       HPI:  Naina is a 89 y.o. female seen today in routine physician LTC follow-up at [facility]. She has diagnoses of HTN, not currently on BP meds, anemia on iron, DJD, dementia. She has visual impairment and is on eye drops.     Her weight has been stable. No open areas on skin. She has not been ill recently with cough cold or congestion. No fever. No change in bowel or bladder habits. She has diagnosis of dementia, sometimes confused, but cooperative. BIMS score is 5 indicating severe cognitive impairment. She tends to stay in her room, doesn't enjoy the activities but does go to the dining room for meals. She is on minimal meds, mostly bowel meds, eye drops and iron. No specific concerns from nursing.       Past Medical History:  Past Medical History:   Diagnosis Date     Cancer     Anal     Constipation     Chronic     Dementia      Hypertension      Sciatica        Medications:  Current Outpatient Prescriptions   Medication Sig     acetaminophen (TYLENOL) 325 MG tablet Take 650 mg by mouth every morning.     acetaminophen (TYLENOL) 325 MG tablet Take 650 mg by mouth bedtime.      acetaminophen (TYLENOL) 325 MG tablet Take 650 mg by mouth every 4 (four) hours as needed for pain.     aspirin 81 mg chewable tablet Chew 81 mg every morning.     bisacodyl (DULCOLAX) 10 mg suppository Insert 10 mg into the rectum daily as needed.     calcium, as carbonate, (TUMS) 200 mg calcium (500 mg) chewable tablet Chew 1 tablet 2 (two) times a day.     docusate sodium (COLACE) 100 MG capsule Take 100 mg by mouth once daily.     dorzolamide-timolol (COSOPT) 22.3-6.8 mg/mL ophthalmic solution Administer 1 drop to both eyes 2 (two) times a day.     ferrous sulfate 325 (65 FE) MG tablet Take 1 tablet  by mouth 2 (two) times a day.      latanoprost (XALATAN) 0.005 % ophthalmic solution Administer 1 drop to both eyes bedtime.     omeprazole (PRILOSEC) 20 MG capsule Take 20 mg by mouth daily.     polyethylene glycol (MIRALAX) 17 gram packet Take 17 g by mouth daily.      polyethylene glycol (MIRALAX) 17 gram packet Take 17 g by mouth daily as needed.     polyvinyl alcohol (LIQUIFILM TEARS) 1.4 % ophthalmic solution Administer 1 drop to both eyes every 2 (two) hours as needed for dry eyes.       Physical Exam:  General: Patient is alert, pleasant elderly female, no distress.   Vitals: /62, Temp 97.1, Pulse 68, RR 20, O2 sat 95 %RA.  HEENT: Head is NCAT. Eyes show no injection or icterus. Nares negative. Oropharynx well hydrated.  Neck: Supple. No tenderness or adenopathy. No JVD.  Lungs: Clear bilaterally. No wheezes.  Cardiovascular: Regular rate and rhythm, systolic murmur.  Back: No spinal or CVA tenderness. Signif kyphosis  Abdomen: Soft, no tenderness on exam. Bowel sounds present. No guarding rebound or rigidity.  Extremities: No edema is noted.  Musculoskeletal: Age related degen changes.  Psych: Mood appears good.      Labs:  6/10/15: TSH 2.42.  2/15/16: Na 139, K 4.0, BUN 31, Cr 0.88, GFR 60.  8/23/16: wbc 5.8, hgb 11.4, plts 191.  6/22/17: wbc 4.7, hgb 11.3, plts 187.      Assessment/Plan:  1. HTN. BPs are well controlled, not on antihypertensives.  2. Anemia. Hgb has been stable, continues on iron.  3. DJD. Occ complaints of pain, has tylenol. No increased concerns.   4. Dementia. No disruptive behaviors.    Overall she appears stable and no new orders needed.      Electronically signed by: Sandra Wing MD

## 2021-06-12 NOTE — PROGRESS NOTES
[facility]    Name:   Naina Lozada  : 1923  Facility:   [facility] [[id number]]   Room: 217  Code Status: DNR/DNI -   Fac type:   NF (Long Term Care, LTC) -     CHIEF COMPLAINT / REASON FOR VISIT:  Chief Complaint   Patient presents with     Review Of Multiple Medical Conditions    Patient was last seen by me on 17.    HPI: Naina is a very pleasant 89 y.o. female with mild dementia (without behavioral disturbance) and severe hearing loss, who is pretty much independent, ambulatory with a walker and without human assistance, independent with bed mobility, and continent of both bowel and bladder. She tells me she exercises every day. She asks me if there is anything to straighten her severely kyphotic back.    She previously had a diagnosis of hypertension and was on 12.5 mg of atenolol BID. When she began presenting consistently with systolics below 100, we discontinued her atenolol. Blood pressures continue to be on the low side with systolics typically below 100, although today her blood pressure is normal. Despite this, she denies any problems with dizziness. 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    She has chronic intermittent constipation, but may have occasional loose stools. She receives both scheduled Colace (100 mg QD) and MiraLAX (QD and QD PRN). This is working well.     She also has a history of anal ulcers, as well as a history of urinary retention with incomplete bladder emptying (no current problems), and some right-sided sciatica, although she offers no complaints of that today nor has she during several recent visits.     She has a history of anemia.  We did decrease her ferrous sulfate from 325 mg twice daily to 325 mg daily.  A follow-up hemoglobin one month later was 11.3.    In 2015 she was evaluated by GI, undergoing an upper endoscopy and later a dilatation due to a esophageal stricture at the GE junction. Also  "noted were Ayush's erosions in a large hiatal hernia, and she is now receiving a proton pump inhibitor with iron supplementation to address her anemia. She was subsequently seen by Dr. Sin in April 2016 due to recurrence of her swallowing difficulty. She confirmed that sometimes it took 2 or 3 tries to swallow food. When the dilatation was performed one month earlier by Dr. Raghu Mills, he stated in his note that if swallowing difficulty recurred, a video swallow esophogram should be done with a follow-up in their esophageal clinic prior to further endoscopic dilatation. This was performed on 04/14/16. The image acquisition was compromised due to limited patient mobility, but what was noted was a moderate sized sliding hiatal hernia with large volume of gastroesophageal reflux, a Schatzki's ring, and small cervical esophageal diverticulum but no significant stricture. Currently, she tells me everything is all right, but she needs to be careful, making sure that what she eats is in small pieces.    She has osteoarthritis and some stiffness all over, particularly in the morning, but she rarely complains and seemed to be doing well on 650 mg of acetaminophen in the morning and 650 mg in the evening. Despite her arthritis, she typically tells me, \"I keep active anyway.\"  She exercises, ambulating every morning and evening around the facility.  If at all possible, she wants no one doing anything for her that she can do for herself.    No complaints of headaches, chest pain, nausea or vomiting, coughing or congestion, dizziness or dyspnea, dysuria, integumentary issues, problems with sleep, or feelings of depression or anxiety.    Past Medical History:   Diagnosis Date     Cancer     Anal     Constipation     Chronic     Dementia      Hypertension      Sciatica              No family history on file.    MEDICATIONS: Reviewed from the MAR, physician orders, and earlier progress notes.  Updated by me today (07/27/17) " with the decrease and ferrous sulfate reflected below.  Current Outpatient Prescriptions   Medication Sig     acetaminophen (TYLENOL) 325 MG tablet Take 650 mg by mouth every morning.     acetaminophen (TYLENOL) 325 MG tablet Take 650 mg by mouth bedtime.      acetaminophen (TYLENOL) 325 MG tablet Take 650 mg by mouth every 4 (four) hours as needed for pain.     aspirin 81 mg chewable tablet Chew 81 mg every morning.     bisacodyl (DULCOLAX) 10 mg suppository Insert 10 mg into the rectum daily as needed.     calcium, as carbonate, (TUMS) 200 mg calcium (500 mg) chewable tablet Chew 1 tablet 2 (two) times a day.     docusate sodium (COLACE) 100 MG capsule Take 100 mg by mouth once daily.     dorzolamide-timolol (COSOPT) 22.3-6.8 mg/mL ophthalmic solution Administer 1 drop to both eyes 2 (two) times a day.     ferrous sulfate 325 (65 FE) MG tablet Take 1 tablet by mouth daily with breakfast.      latanoprost (XALATAN) 0.005 % ophthalmic solution Administer 1 drop to both eyes bedtime.     omeprazole (PRILOSEC) 20 MG capsule Take 20 mg by mouth daily.     polyethylene glycol (MIRALAX) 17 gram packet Take 17 g by mouth daily.      polyethylene glycol (MIRALAX) 17 gram packet Take 17 g by mouth daily as needed.     polyvinyl alcohol (LIQUIFILM TEARS) 1.4 % ophthalmic solution Administer 1 drop to both eyes every 2 (two) hours as needed for dry eyes.     ALLERGIES:   Allergies   Allergen Reactions     Codeine      DIET: Regular.    Vitals:    08/14/17 1404   BP: 126/72   Pulse: 68   Resp: 20   Temp: (!) 96.5  F (35.8  C)   Weight: 111 lb 9.6 oz (50.6 kg)   Weight has been very stable of late. Admission weight was 96.6 pounds.    EXAMINATION:   General: Pleasant, alert, and conversant elderly female, ambulating independently down the hallway with her rolling walker, in no apparent distress. 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  Head: Normocephalic and atraumatic.   Eyes: Deformed left pupil, but  sclerae are clear.   ENT: Moist oral mucosa. She has her own teeth. No rhinorrhea or nasal discharge. There is significant/severe hearing loss, although she does seem to hear a bit better out of the right ear.  She does try to lip read, but some words are rather similar.  I need to write it down.  Cardiovascular: Regular rate and rhythm with a coarse 3/6 ANN MARIE at the LSB.  Respiratory: Lungs clear to auscultation bilaterally.   Abdomen: Soft and nontender.   Musculoskeletal/Extremities: Age-related degenerative joint disease. Significant dextrokyphoscoliosis and bilateral hallux valgus deformities with arthritic deformities also noted in the hands. No peripheral edema.  Integument: No rashes, clinically significant lesions, or skin breakdown.   Cognitive/Psychiatric: There are indications of mild dementia. Affect is euthymic.    DIAGNOSTICS:   Results for orders placed in visit on 08/21/14   BASIC METABOLIC PANEL       Result Value Ref Range    Sodium 142  136-145 mmol/L    Potassium 3.9  3.5-5.0 mmol/L    Chloride 110 (*)  mmol/L    CO2 21 (*) 22-31 mmol/L    Anion Gap, Calculation 11  5-18 mmol/L    Glucose 95   mg/dL    Calcium 9.6  8.5-10.5 mg/dL    BUN 28  8-28 mg/dL    Creatinine 0.72  0.60-1.10 mg/dL    GFR MDRD Af Amer >60  >60 mL/min/1.73m2    GFR MDRD Non Af Amer >60  >60 mL/min/1.73m2     Lab Results   Component Value Date    WBC 4.7 06/22/2017    HGB 11.3 (L) 06/22/2017    HCT 33.6 (L) 06/22/2017    MCV 91 06/22/2017     06/22/2017     CrCl cannot be calculated (Patient's most recent sCr result is older than the maximum 5 days allowed.).  Lab Results   Component Value Date    TSH 2.42 06/10/2015     Lab Results   Component Value Date    ALT 10 06/10/2015    AST 18 06/10/2015    ALKPHOS 69 06/10/2015    BILITOT 0.3 06/10/2015     ASSESSMENT/Plan:      ICD-10-CM    1. Late onset Alzheimer's disease without behavioral disturbance G30.1     F02.80    2. Presbycusis of both ears H91.13    3.  Primary osteoarthritis involving multiple joints M15.0    4. Idiopathic hypotension I95.0    5. Constipation, unspecified constipation type K59.00    6. Anemia, unspecified type D64.9    7. S/P dilatation of esophageal stricture Z98.890     Z87.19      CHANGES:  None.    CARE PLAN:    The care plan has been reviewed and all orders signed. Changes to care plan, if any, as noted. Otherwise, continue care plan of care.      Electronically signed by: Braxton Messer CNP

## 2021-06-12 NOTE — PROGRESS NOTES
[facility]    Name:   Naina Lozada  : 1923  Facility:   [facility] [[id number]]   Room: 217  Code Status: DNR/DNI -   Fac type:   NF (Long Term Care, LTC) -     CHIEF COMPLAINT / REASON FOR VISIT:  Chief Complaint   Patient presents with     Review Of Multiple Medical Conditions    Patient was last seen by me on 17 and subsequently seen by Dr. Wing on 17.    HPI: Naina is a very pleasant 89 y.o. female with mild dementia (without behavioral disturbance) and severe hearing loss, who is pretty much independent, ambulatory with a walker and without human assistance, independent with bed mobility, and continent of both bowel and bladder. She tells me she exercises every day. She asks me if there is anything to straighten her severely kyphotic back.    She previously had a diagnosis of hypertension and was on 12.5 mg of atenolol BID. When she began presenting consistently with systolics below 100, we discontinued her atenolol. Blood pressures continue to be on the low side with systolics typically below 100, although today her blood pressure is normal. Despite this, she denies any problems with dizziness. 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    She has chronic intermittent constipation, but may have occasional loose stools. She receives both scheduled Colace (100 mg QD) and MiraLAX (QD and QD PRN). This is working well.     She also has a history of anal ulcers, as well as a history of urinary retention with incomplete bladder emptying (no current problems), and some right-sided sciatica, although she offers no complaints of that today nor has she during several recent visits.     She has a history of anemia.  We did decrease her ferrous sulfate from 325 mg twice daily to 325 mg daily.  A follow-up hemoglobin one month later was 11.3.    In 2015 she was evaluated by GI, undergoing an upper endoscopy and later a dilatation due  "to a esophageal stricture at the GE junction. Also noted were Ayush's erosions in a large hiatal hernia, and she is now receiving a proton pump inhibitor with iron supplementation to address her anemia. She was subsequently seen by Dr. Sin in April 2016 due to recurrence of her swallowing difficulty. She confirmed that sometimes it took 2 or 3 tries to swallow food. When the dilatation was performed one month earlier by Dr. Raghu Mills, he stated in his note that if swallowing difficulty recurred, a video swallow esophogram should be done with a follow-up in their esophageal clinic prior to further endoscopic dilatation. This was performed on 04/14/16. The image acquisition was compromised due to limited patient mobility, but what was noted was a moderate sized sliding hiatal hernia with large volume of gastroesophageal reflux, a Schatzki's ring, and small cervical esophageal diverticulum but no significant stricture. Currently, she tells me everything is all right, but she needs to be careful, making sure that what she eats is in small pieces.    She has arthritis and some stiffness all over, particularly in the morning, but she rarely complains and seemed to be doing well on 650 mg of acetaminophen in the morning and 650 mg in the evening. Despite her arthritis, she typically tells me, \"I keep active anyway.\"  She exercises, ambulating every morning and evening around the facility.  She says, \"I do not want anyone doing anything for me.\"      No complaints of headaches, chest pain, nausea or vomiting, coughing or congestion, dizziness or dyspnea, dysuria, integumentary issues, problems with sleep, or feelings of depression or anxiety.    Past Medical History:   Diagnosis Date     Cancer     Anal     Constipation     Chronic     Dementia      Hypertension      Sciatica              No family history on file.    MEDICATIONS: Reviewed from the MAR, physician orders, and earlier progress notes.  Updated by me " today (07/27/17) with the decrease and ferrous sulfate reflected below.  Current Outpatient Prescriptions   Medication Sig     acetaminophen (TYLENOL) 325 MG tablet Take 650 mg by mouth every morning.     acetaminophen (TYLENOL) 325 MG tablet Take 650 mg by mouth bedtime.      acetaminophen (TYLENOL) 325 MG tablet Take 650 mg by mouth every 4 (four) hours as needed for pain.     aspirin 81 mg chewable tablet Chew 81 mg every morning.     bisacodyl (DULCOLAX) 10 mg suppository Insert 10 mg into the rectum daily as needed.     calcium, as carbonate, (TUMS) 200 mg calcium (500 mg) chewable tablet Chew 1 tablet 2 (two) times a day.     docusate sodium (COLACE) 100 MG capsule Take 100 mg by mouth once daily.     dorzolamide-timolol (COSOPT) 22.3-6.8 mg/mL ophthalmic solution Administer 1 drop to both eyes 2 (two) times a day.     ferrous sulfate 325 (65 FE) MG tablet Take 1 tablet by mouth daily with breakfast.      latanoprost (XALATAN) 0.005 % ophthalmic solution Administer 1 drop to both eyes bedtime.     omeprazole (PRILOSEC) 20 MG capsule Take 20 mg by mouth daily.     polyethylene glycol (MIRALAX) 17 gram packet Take 17 g by mouth daily.      polyethylene glycol (MIRALAX) 17 gram packet Take 17 g by mouth daily as needed.     polyvinyl alcohol (LIQUIFILM TEARS) 1.4 % ophthalmic solution Administer 1 drop to both eyes every 2 (two) hours as needed for dry eyes.     ALLERGIES:   Allergies   Allergen Reactions     Codeine      DIET: Regular.    Vitals:    07/27/17 1322   BP: 126/72   Pulse: 68   Resp: 20   Temp: (!) 96.3  F (35.7  C)   Weight: 110 lb 3.2 oz (50 kg)   Weight has been very stable of late. Admission weight was 96.6 pounds.    EXAMINATION:   General: Pleasant, alert, and conversant elderly female, ambulating independently down the hallway with her rolling walker, in no apparent distress. 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  Head: Normocephalic and atraumatic.   Eyes: Deformed left pupil, but  sclerae are clear.   ENT: Moist oral mucosa. She has her own teeth. No rhinorrhea or nasal discharge. There is significant/severe hearing loss, although she does seem to hear a bit better out of the right ear.  She does try to lip read, but some words are rather similar.  I need to write it down.  Cardiovascular: Regular rate and rhythm with a coarse 3/6 ANN MARIE at the LSB.  Respiratory: Lungs clear to auscultation bilaterally.   Abdomen: Soft and nontender.   Musculoskeletal/Extremities: Age-related degenerative joint disease. Significant dextrokyphoscoliosis and bilateral hallux valgus deformities with arthritic deformities also noted in the hands. No peripheral edema.  Integument: No rashes, clinically significant lesions, or skin breakdown.   Cognitive/Psychiatric: There are indications of mild dementia. Affect is euthymic.    DIAGNOSTICS:   Results for orders placed in visit on 08/21/14   BASIC METABOLIC PANEL       Result Value Ref Range    Sodium 142  136-145 mmol/L    Potassium 3.9  3.5-5.0 mmol/L    Chloride 110 (*)  mmol/L    CO2 21 (*) 22-31 mmol/L    Anion Gap, Calculation 11  5-18 mmol/L    Glucose 95   mg/dL    Calcium 9.6  8.5-10.5 mg/dL    BUN 28  8-28 mg/dL    Creatinine 0.72  0.60-1.10 mg/dL    GFR MDRD Af Amer >60  >60 mL/min/1.73m2    GFR MDRD Non Af Amer >60  >60 mL/min/1.73m2     Lab Results   Component Value Date    WBC 4.7 06/22/2017    HGB 11.3 (L) 06/22/2017    HCT 33.6 (L) 06/22/2017    MCV 91 06/22/2017     06/22/2017     CrCl cannot be calculated (Patient's most recent sCr result is older than the maximum 5 days allowed.).  Lab Results   Component Value Date    TSH 2.42 06/10/2015     Lab Results   Component Value Date    ALT 10 06/10/2015    AST 18 06/10/2015    ALKPHOS 69 06/10/2015    BILITOT 0.3 06/10/2015     ASSESSMENT/Plan:      ICD-10-CM    1. Late onset Alzheimer's disease without behavioral disturbance G30.1     F02.80    2. Presbycusis of both ears H91.13    3.  Idiopathic hypotension I95.0    4. Constipation, unspecified constipation type K59.00    5. Primary osteoarthritis involving multiple joints M15.0    6. Anemia, unspecified type D64.9    7. S/P dilatation of esophageal stricture Z98.890     Z87.19      CHANGES:  None.    CARE PLAN:    The care plan has been reviewed and all orders signed. Changes to care plan, if any, as noted. Otherwise, continue care plan of care.      Electronically signed by: Braxton Mseser CNP

## 2021-06-13 NOTE — PROGRESS NOTES
[facility]    Facility:   [facility] [[id number]]   Code Status: DNR/DNI       Chief Complaint   Patient presents with     Review Of Multiple Medical Conditions     LTC 9/30/17.       HPI:  Naina is a 89 y.o. female seen today in routine physician LT follow-up at [facility]. She has diagnoses of HTN, not currently on BP meds, anemia on iron, DJD, dementia. She has visual impairment and is on eye drops.     Naina has been stable. Weight and appetite stable. BPs 112-128 with 1 at 141 systolic. No medication changes. She has not been ill recently with cough cold or congestion. No fever. No change in bowel or bladder habits. She has diagnosis of dementia, sometimes confused, but cooperative. BIMS score is 5 indicating severe cognitive impairment. She tends to stay in her room, doesn't enjoy the activities but does go to the dining room for meals. She is on minimal meds, mostly bowel meds, eye drops and iron. No specific concerns from nursing.       Past Medical History:  Past Medical History:   Diagnosis Date     Cancer     Anal     Constipation     Chronic     Dementia      Hypertension      Sciatica        Medications:  Current Outpatient Prescriptions   Medication Sig     acetaminophen (TYLENOL) 325 MG tablet Take 650 mg by mouth every morning.     acetaminophen (TYLENOL) 325 MG tablet Take 650 mg by mouth bedtime.      acetaminophen (TYLENOL) 325 MG tablet Take 650 mg by mouth every 4 (four) hours as needed for pain.     aspirin 81 mg chewable tablet Chew 81 mg every morning.     bisacodyl (DULCOLAX) 10 mg suppository Insert 10 mg into the rectum daily as needed.     calcium, as carbonate, (TUMS) 200 mg calcium (500 mg) chewable tablet Chew 1 tablet 2 (two) times a day.     docusate sodium (COLACE) 100 MG capsule Take 100 mg by mouth once daily.     dorzolamide-timolol (COSOPT) 22.3-6.8 mg/mL ophthalmic solution Administer 1 drop to both eyes 2 (two) times  a day.     ferrous sulfate 325 (65 FE) MG tablet Take 1 tablet by mouth daily with breakfast.      latanoprost (XALATAN) 0.005 % ophthalmic solution Administer 1 drop to both eyes bedtime.     omeprazole (PRILOSEC) 20 MG capsule Take 20 mg by mouth daily.     polyethylene glycol (MIRALAX) 17 gram packet Take 17 g by mouth daily.      polyethylene glycol (MIRALAX) 17 gram packet Take 17 g by mouth daily as needed.     polyvinyl alcohol (LIQUIFILM TEARS) 1.4 % ophthalmic solution Administer 1 drop to both eyes every 2 (two) hours as needed for dry eyes.       Physical Exam:  General: Patient is alert, pleasant elderly female, no distress.   Vitals: /60, Temp 96, Pulse 57, RR 18, O2 sat 100 %RA.  HEENT: Head is NCAT. Eyes show no injection or icterus. Nares negative. Oropharynx well hydrated.  Lungs: Clear bilaterally. No wheezes.  Cardiovascular: Regular rate and rhythm, systolic murmur.  Abdomen: Soft, no tenderness on exam. Bowel sounds present. No guarding rebound or rigidity.  Extremities: No edema is noted.  Musculoskeletal: Age related degen changes.  Psych: Mood appears good.      Labs:  6/10/15: TSH 2.42.  2/15/16: Na 139, K 4.0, BUN 31, Cr 0.88, GFR 60.  8/23/16: wbc 5.8, hgb 11.4, plts 191.  6/22/17: wbc 4.7, hgb 11.3, plts 187.      Assessment/Plan:  1. HTN. BPs are acceptable. She is not currently on BP meds.  2. DJD. Occ complaints of pain, none today.  3. Anemia. Mild, last Hgb at 11.3. She is on iron.  4. Dementia. No behaviors.     Overall she appears stable, no new orders needed today.       Electronically signed by: Sandra Wing MD

## 2021-06-13 NOTE — PROGRESS NOTES
[facility]    Name:   Naina Lozada  : 1923  Facility:   [facility] [[id number]]   Room: 217  Code Status: DNR/DNI -   Fac type:   NF (Long Term Care, LTC) -     CHIEF COMPLAINT / REASON FOR VISIT:  Chief Complaint   Patient presents with     Review Of Multiple Medical Conditions    Patient was last seen by me on 17 and subsequently seen by Dr. Wing on 17.    HPI: Naina is a very pleasant 89 y.o. female with mild dementia (without behavioral disturbance) and severe hearing loss, who is pretty much independent, ambulatory with a walker and without human assistance, independent with bed mobility, and continent of both bowel and bladder. She tells me she exercises every day. She asks me if there is anything to straighten her severely kyphotic back.    She has chronic intermittent constipation, but may have occasional loose stools. She receives both scheduled Colace (100 mg QD) and MiraLAX (QD and QD PRN). This is working well.     She also has a history of anal ulcers, as well as a history of urinary retention with incomplete bladder emptying (no current problems), and some right-sided sciatica, although she offers no complaints during several recent visits.     In 2015 she was evaluated by GI, undergoing an upper endoscopy and later a dilatation due to a esophageal stricture at the GE junction. Also noted were Ayush's erosions in a large hiatal hernia, and she is now receiving a proton pump inhibitor with iron supplementation to address her anemia. She was subsequently seen by Dr. Sin in 2016 due to recurrence of her swallowing difficulty. She confirmed that sometimes it took 2 or 3 tries to swallow food. When the dilatation was performed one month earlier by Dr. Raghu Mills, he stated in his note that if swallowing difficulty recurred, a video swallow esophogram should be done with a follow-up in their esophageal clinic prior  "to further endoscopic dilatation. This was performed on 04/14/16. The image acquisition was compromised due to limited patient mobility, but what was noted was a moderate sized sliding hiatal hernia with large volume of gastroesophageal reflux, a Schatzki's ring, and small cervical esophageal diverticulum but no significant stricture. Currently, she tells me everything is all right, but she needs to be careful, making sure that what she eats is in small pieces.    She has osteoarthritis and some stiffness all over, particularly in the morning, but she rarely complains and seemed to be doing well on 650 mg of acetaminophen in the morning and 650 mg in the evening. Despite her arthritis, she typically tells me, \"I keep active anyway.\"  She exercises, ambulating every morning and evening around the facility.  If at all possible, she wants no one doing anything for her that she can do for herself.    She has a history of anemia.  We did decrease her ferrous sulfate from 325 mg twice daily to 325 mg daily back in May.  A follow-up hemoglobin one month later was 11.3.       CURRENT ISSUES    She previously had a diagnosis of hypertension and was on 12.5 mg of atenolol BID. When she began presenting with systolics consistently below 100, we discontinued her atenolol. Blood pressures continue to be on the low side with systolics often below 100, although today her blood pressure is normal. She denies any problems with dizziness. 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  She continues to be mostly independent and is doing quite well.     ROS:  No complaints of headaches, chest pain, nausea or vomiting, coughing or congestion, dizziness or dyspnea, dysuria, integumentary issues, problems with sleep, or feelings of depression or anxiety.    Past Medical History:   Diagnosis Date     Cancer     Anal     Constipation     Chronic     Dementia      Hypertension      Sciatica              No family history on " file.    MEDICATIONS: Reviewed from the MAR, physician orders, and earlier progress notes.   Current Outpatient Prescriptions   Medication Sig     acetaminophen (TYLENOL) 325 MG tablet Take 650 mg by mouth every morning.     acetaminophen (TYLENOL) 325 MG tablet Take 650 mg by mouth bedtime.      acetaminophen (TYLENOL) 325 MG tablet Take 650 mg by mouth every 4 (four) hours as needed for pain.     aspirin 81 mg chewable tablet Chew 81 mg every morning.     bisacodyl (DULCOLAX) 10 mg suppository Insert 10 mg into the rectum daily as needed.     calcium, as carbonate, (TUMS) 200 mg calcium (500 mg) chewable tablet Chew 1 tablet 2 (two) times a day.     docusate sodium (COLACE) 100 MG capsule Take 100 mg by mouth once daily.     dorzolamide-timolol (COSOPT) 22.3-6.8 mg/mL ophthalmic solution Administer 1 drop to both eyes 2 (two) times a day.     ferrous sulfate 325 (65 FE) MG tablet Take 1 tablet by mouth daily with breakfast.      latanoprost (XALATAN) 0.005 % ophthalmic solution Administer 1 drop to both eyes bedtime.     omeprazole (PRILOSEC) 20 MG capsule Take 20 mg by mouth daily.     polyethylene glycol (MIRALAX) 17 gram packet Take 17 g by mouth daily.      polyethylene glycol (MIRALAX) 17 gram packet Take 17 g by mouth daily as needed.     polyvinyl alcohol (LIQUIFILM TEARS) 1.4 % ophthalmic solution Administer 1 drop to both eyes every 2 (two) hours as needed for dry eyes.     ALLERGIES:   Allergies   Allergen Reactions     Codeine      DIET: Regular.    Vitals:    10/12/17 1259   BP: 112/60   Pulse: (!) 57   Resp: 18   Temp: (!) 96  F (35.6  C)   Weight: 110 lb (49.9 kg)   Weight has been very stable of late. Admission weight was 96.6 pounds.    EXAMINATION:   General: Pleasant, alert, and conversant elderly female, ambulating independently down the hallway with her rolling walker, in no apparent distress. 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  Head: Normocephalic and atraumatic.   Eyes:  Deformed left pupil, but sclerae are clear.   ENT: Moist oral mucosa. She has her own teeth. No rhinorrhea or nasal discharge. There is significant/severe hearing loss, although she does seem to hear a bit better out of the right ear.  She does try to lip read, but some words are rather similar.  I need to write it down.  Cardiovascular: Regular rate and rhythm with a coarse 3/6 ANN MARIE at the LSB.  Respiratory: Lungs clear to auscultation bilaterally.   Abdomen: Soft and nontender.   Musculoskeletal/Extremities: Age-related degenerative joint disease. Significant dextrokyphoscoliosis and bilateral hallux valgus deformities with arthritic deformities also noted in the hands. No peripheral edema.  Integument: No rashes, clinically significant lesions, or skin breakdown.   Cognitive/Psychiatric: There are indications of mild dementia. Affect is euthymic.    DIAGNOSTICS:   Results for orders placed in visit on 08/21/14   BASIC METABOLIC PANEL       Result Value Ref Range    Sodium 142  136-145 mmol/L    Potassium 3.9  3.5-5.0 mmol/L    Chloride 110 (*)  mmol/L    CO2 21 (*) 22-31 mmol/L    Anion Gap, Calculation 11  5-18 mmol/L    Glucose 95   mg/dL    Calcium 9.6  8.5-10.5 mg/dL    BUN 28  8-28 mg/dL    Creatinine 0.72  0.60-1.10 mg/dL    GFR MDRD Af Amer >60  >60 mL/min/1.73m2    GFR MDRD Non Af Amer >60  >60 mL/min/1.73m2     Lab Results   Component Value Date    WBC 4.7 06/22/2017    HGB 11.3 (L) 06/22/2017    HCT 33.6 (L) 06/22/2017    MCV 91 06/22/2017     06/22/2017     CrCl cannot be calculated (Patient's most recent sCr result is older than the maximum 5 days allowed.).  Lab Results   Component Value Date    TSH 2.42 06/10/2015     Lab Results   Component Value Date    ALT 10 06/10/2015    AST 18 06/10/2015    ALKPHOS 69 06/10/2015    BILITOT 0.3 06/10/2015     ASSESSMENT/Plan:      ICD-10-CM    1. Late onset Alzheimer's disease without behavioral disturbance G30.1     F02.80    2. Presbycusis  of both ears H91.13    3. Primary osteoarthritis involving multiple joints M15.0    4. Idiopathic hypotension I95.0    5. Constipation, unspecified constipation type K59.00    6. Anemia, unspecified type D64.9    7. S/P dilatation of esophageal stricture Z98.890     Z87.19      CHANGES:  None.    CARE PLAN:    The care plan has been reviewed and all orders signed. Changes to care plan, if any, as noted. Otherwise, continue care plan of care.      Electronically signed by: Braxton Messer CNP

## 2021-06-13 NOTE — PROGRESS NOTES
[facility]    Name:   Naina Lozada  : 1923  Facility:   [facility] [[id number]]   Room: 217  Code Status: DNR/DNI -   Fac type:   NF (Long Term Care, LTC) -     CHIEF COMPLAINT / REASON FOR VISIT:  Chief Complaint   Patient presents with     Review Of Multiple Medical Conditions    Patient was last seen by me on 10/12/17.    HPI: Naina is a very pleasant 89 y.o. female with mild dementia (without behavioral disturbance) and severe hearing loss, who is pretty much independent, ambulatory with a walker and without human assistance, independent with bed mobility, and continent of both bowel and bladder. She exercises every day, usually by walking a good distance around the halls. She has asked me if there is anything to straighten her severely kyphotic back.  This is about the only complaint she offers.    She has chronic intermittent constipation, but may have occasional loose stools. She receives both scheduled Colace (100 mg QD) and MiraLAX (QD and QD PRN). This is working well.     She also has a history of anal ulcers, as well as a history of urinary retention with incomplete bladder emptying (no current problems), and some right-sided sciatica, although she offers no complaints during several recent visits.     In 2015 she was evaluated by GI, undergoing an upper endoscopy and later a dilatation due to a esophageal stricture at the GE junction. Also noted were Ayush's erosions in a large hiatal hernia, and she is now receiving a proton pump inhibitor with iron supplementation to address her anemia. She was subsequently seen by Dr. Sin in 2016 due to recurrence of her swallowing difficulty. She confirmed that sometimes it took 2 or 3 tries to swallow food. When the dilatation was performed one month earlier by Dr. Raghu Mills, he stated in his note that if swallowing difficulty recurred, a video swallow esophogram should be done with a  "follow-up in their esophageal clinic prior to further endoscopic dilatation. This was performed on 04/14/16. The image acquisition was compromised due to limited patient mobility, but what was noted was a moderate sized sliding hiatal hernia with large volume of gastroesophageal reflux, a Schatzki's ring, and small cervical esophageal diverticulum but no significant stricture. Currently, she tells me everything is all right, but she needs to be careful, making sure that what she eats is in small pieces.    She has osteoarthritis and some stiffness all over, particularly in the morning, but she rarely complains and seemed to be doing well on 650 mg of acetaminophen in the morning and 650 mg in the evening. Despite her arthritis, she typically tells me, \"I keep active anyway.\"  She exercises, ambulating every morning and evening around the facility.  If at all possible, she wants no one doing anything for her that she can do for herself.    She has a history of anemia.  We did decrease her ferrous sulfate from 325 mg twice daily to 325 mg daily back in May.  A follow-up hemoglobin one month later was 11.3.       CURRENT ISSUES    She previously had a diagnosis of hypertension and was on 12.5 mg of atenolol BID. When she began presenting with systolics consistently below 100, we discontinued her atenolol. Blood pressures continue to be on the low side with systolics often below 100, although today her blood pressure is normal. She denies any problems with dizziness. 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  She continues to be mostly independent and is doing quite well.     ROS:  No complaints of headaches, chest pain, nausea or vomiting, coughing or congestion, dizziness or dyspnea, dysuria, integumentary issues, problems with sleep, or feelings of depression or anxiety.    Past Medical History:   Diagnosis Date     Cancer     Anal     Constipation     Chronic     Dementia      Hypertension      Sciatica     "          No family history on file.    MEDICATIONS: Reviewed from the MAR, physician orders, and earlier progress notes.   Current Outpatient Prescriptions   Medication Sig     acetaminophen (TYLENOL) 325 MG tablet Take 650 mg by mouth every morning.     acetaminophen (TYLENOL) 325 MG tablet Take 650 mg by mouth bedtime.      acetaminophen (TYLENOL) 325 MG tablet Take 650 mg by mouth every 4 (four) hours as needed for pain.     aspirin 81 mg chewable tablet Chew 81 mg every morning.     bisacodyl (DULCOLAX) 10 mg suppository Insert 10 mg into the rectum daily as needed.     calcium, as carbonate, (TUMS) 200 mg calcium (500 mg) chewable tablet Chew 1 tablet 2 (two) times a day.     docusate sodium (COLACE) 100 MG capsule Take 100 mg by mouth once daily.     dorzolamide-timolol (COSOPT) 22.3-6.8 mg/mL ophthalmic solution Administer 1 drop to both eyes 2 (two) times a day.     ferrous sulfate 325 (65 FE) MG tablet Take 1 tablet by mouth daily with breakfast.      latanoprost (XALATAN) 0.005 % ophthalmic solution Administer 1 drop to both eyes bedtime.     omeprazole (PRILOSEC) 20 MG capsule Take 20 mg by mouth daily.     polyethylene glycol (MIRALAX) 17 gram packet Take 17 g by mouth daily.      polyethylene glycol (MIRALAX) 17 gram packet Take 17 g by mouth daily as needed.     polyvinyl alcohol (LIQUIFILM TEARS) 1.4 % ophthalmic solution Administer 1 drop to both eyes every 2 (two) hours as needed for dry eyes.     ALLERGIES:   Allergies   Allergen Reactions     Codeine      DIET: Regular.    Vitals:    11/02/17 1549   BP: 112/55   Pulse: 60   Resp: 18   Temp: (!) 95.3  F (35.2  C)   Weight: 110 lb 9.6 oz (50.2 kg)   Weight has been very stable of late. Admission weight was 96.6 pounds.    EXAMINATION:   General: Pleasant, alert, and conversant elderly female, ambulating independently down the hallway with her rolling walker, in no apparent distress. 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  Head:  Normocephalic and atraumatic.   Eyes: Deformed left pupil, but sclerae are clear.   ENT: Moist oral mucosa. She has her own teeth. No rhinorrhea or nasal discharge. There is significant/severe hearing loss, although she does seem to hear a bit better out of the right ear.  She does try to lip read, but some words are rather similar.  I need to write it down.  Cardiovascular: Regular rate and rhythm with a coarse 3/6 ANN MARIE at the LSB.  Respiratory: Lungs clear to auscultation bilaterally.   Abdomen: Soft and nontender.   Musculoskeletal/Extremities: Age-related degenerative joint disease. Significant dextrokyphoscoliosis and bilateral hallux valgus deformities with arthritic deformities also noted in the hands. No peripheral edema.  Integument: No rashes, clinically significant lesions, or skin breakdown.   Cognitive/Psychiatric: There are indications of mild dementia. Affect is euthymic.    DIAGNOSTICS:   Results for orders placed in visit on 08/21/14   BASIC METABOLIC PANEL       Result Value Ref Range    Sodium 142  136-145 mmol/L    Potassium 3.9  3.5-5.0 mmol/L    Chloride 110 (*)  mmol/L    CO2 21 (*) 22-31 mmol/L    Anion Gap, Calculation 11  5-18 mmol/L    Glucose 95   mg/dL    Calcium 9.6  8.5-10.5 mg/dL    BUN 28  8-28 mg/dL    Creatinine 0.72  0.60-1.10 mg/dL    GFR MDRD Af Amer >60  >60 mL/min/1.73m2    GFR MDRD Non Af Amer >60  >60 mL/min/1.73m2     Lab Results   Component Value Date    WBC 4.7 06/22/2017    HGB 11.3 (L) 06/22/2017    HCT 33.6 (L) 06/22/2017    MCV 91 06/22/2017     06/22/2017     CrCl cannot be calculated (Patient's most recent sCr result is older than the maximum 5 days allowed.).  Lab Results   Component Value Date    TSH 2.42 06/10/2015     Lab Results   Component Value Date    ALT 10 06/10/2015    AST 18 06/10/2015    ALKPHOS 69 06/10/2015    BILITOT 0.3 06/10/2015     ASSESSMENT/Plan:      ICD-10-CM    1. Late onset Alzheimer's disease without behavioral  disturbance G30.1     F02.80    2. Presbycusis of both ears H91.13    3. Primary osteoarthritis involving multiple joints M15.0    4. Idiopathic hypotension I95.0    5. Constipation, unspecified constipation type K59.00    6. Anemia, unspecified type D64.9    7. S/P dilatation of esophageal stricture Z98.890     Z87.19      CHANGES:  None.    CARE PLAN:    The care plan has been reviewed and all orders signed. Changes to care plan, if any, as noted. Otherwise, continue care plan of care.      Electronically signed by: Braxton Messer, DAISY

## 2021-06-15 PROBLEM — I95.9 HYPOTENSION: Status: ACTIVE | Noted: 2017-01-26

## 2021-06-15 NOTE — PROGRESS NOTES
[facility]    Name:   Naina Lozada  : 1923  Facility:   [facility] [[id number]]   Room: 217  Code Status: DNR/DNI -   Fac type:   NF (Long Term Care, LTC) -     CHIEF COMPLAINT / REASON FOR VISIT:  Chief Complaint   Patient presents with     Review Of Multiple Medical Conditions    Patient was last seen by me on 17 and subsequently seen by Dr. Wing on 17.    HPI: Naina is a very pleasant 89 y.o. female with mild dementia (without behavioral disturbance) and severe hearing loss, who is pretty much independent, ambulatory with a walker and without human assistance, independent with bed mobility, and continent of both bowel and bladder. She exercises every day, usually by walking a good distance around the halls. She has asked me if there is anything to straighten her severely kyphoscoliotic back.  This is about the only complaint she offers.    She has chronic intermittent constipation but may have occasional loose stools. She receives both scheduled Colace (100 mg QD) and MiraLAX (QD and QD PRN), and this has been working well.      She also has a history of anal ulcers, as well as a history of urinary retention with incomplete bladder emptying (no current problems), and some right-sided sciatica, although she has offered no complaints over the course of several recent visits.     In 2015 she was evaluated by GI, undergoing an upper endoscopy and later a dilatation due to a esophageal stricture at the GE junction. Also noted were Ayush's erosions in a large hiatal hernia, and she is now receiving a proton pump inhibitor with iron supplementation to address her anemia. She was subsequently seen by Dr. Sin in 2016 due to recurrence of her swallowing difficulty. She confirmed that sometimes it took 2 or 3 tries to swallow food. When the dilatation was performed one month earlier by Dr. Raghu Mills, he stated in his note that if  "swallowing difficulty recurred, a video swallow esophogram should be done with a follow-up in their esophageal clinic prior to further endoscopic dilatation. This was performed on 04/14/16. The image acquisition was compromised due to limited patient mobility, but what was noted was a moderate sized sliding hiatal hernia with large volume of gastroesophageal reflux, a Schatzki's ring, and small cervical esophageal diverticulum but no significant stricture. She needs to be careful, making sure that what she eats is in small pieces.    She has osteoarthritis and some stiffness all over, particularly in the morning, but she rarely complains and seemed to be doing well on 650 mg of acetaminophen in the morning and 650 mg in the evening. Despite her arthritis, she typically tells me, \"I keep active anyway.\"  She exercises, ambulating every morning and evening around the facility.  If at all possible, she wants no one doing anything for her that she can do for herself.    She has a history of anemia.  We did decrease her ferrous sulfate from 325 mg twice daily to 325 mg daily back in May 2017.  A follow-up hemoglobin one month later was 11.3.       CURRENT ISSUES    She previously had a diagnosis of hypertension and was on 12.5 mg of atenolol BID. When she began presenting with systolics consistently below 100, we discontinued her atenolol. Blood pressures continue to be on the low side with systolics still occasionally below 100, although today her blood pressure is within normal limits. She denies any problems with dizziness. 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  She continues to be mostly independent and is doing quite well.     ROS:  No complaints of headaches, chest pain, nausea or vomiting, coughing or congestion, dizziness or dyspnea, dysuria, integumentary issues, problems with sleep, or feelings of depression or anxiety.    Past Medical History:   Diagnosis Date     Cancer     Anal     Constipation  "    Chronic     Dementia      Hypertension      Sciatica              No family history on file.    MEDICATIONS: Reviewed from the MAR, physician orders, and earlier progress notes.   Current Outpatient Prescriptions   Medication Sig     acetaminophen (TYLENOL) 325 MG tablet Take 650 mg by mouth every morning.     acetaminophen (TYLENOL) 325 MG tablet Take 650 mg by mouth bedtime.      acetaminophen (TYLENOL) 325 MG tablet Take 650 mg by mouth every 4 (four) hours as needed for pain.     aspirin 81 mg chewable tablet Chew 81 mg every morning.     bisacodyl (DULCOLAX) 10 mg suppository Insert 10 mg into the rectum daily as needed.     calcium, as carbonate, (TUMS) 200 mg calcium (500 mg) chewable tablet Chew 1 tablet 2 (two) times a day.     docusate sodium (COLACE) 100 MG capsule Take 100 mg by mouth once daily.     dorzolamide-timolol (COSOPT) 22.3-6.8 mg/mL ophthalmic solution Administer 1 drop to both eyes 2 (two) times a day.     ferrous sulfate 325 (65 FE) MG tablet Take 1 tablet by mouth daily with breakfast.      latanoprost (XALATAN) 0.005 % ophthalmic solution Administer 1 drop to both eyes bedtime.     omeprazole (PRILOSEC) 20 MG capsule Take 20 mg by mouth daily.     polyethylene glycol (MIRALAX) 17 gram packet Take 17 g by mouth daily.      polyethylene glycol (MIRALAX) 17 gram packet Take 17 g by mouth daily as needed.     polyvinyl alcohol (LIQUIFILM TEARS) 1.4 % ophthalmic solution Administer 1 drop to both eyes every 2 (two) hours as needed for dry eyes.     ALLERGIES:   Allergies   Allergen Reactions     Codeine      DIET: Regular.    Vitals:    02/01/18 1024   BP: 108/70   Pulse: 68   Resp: 18   Temp: 97.2  F (36.2  C)   Weight: 109 lb 3.2 oz (49.5 kg)   Weight has been very stable of late. Admission weight was 96.6 pounds.    EXAMINATION:   General: Pleasant, alert, and conversant elderly female, ambulating independently down the hallway with her rolling walker, in no apparent distress. At one  time, she was a girl's .  Head: Normocephalic and atraumatic.   Eyes: Deformed left pupil, but sclerae are clear.   ENT: Moist oral mucosa. She has her own teeth. No rhinorrhea or nasal discharge. There is significant/severe hearing loss, although she does seem to hear a bit better out of the right ear.  She does try to lip read, but some words are rather similar.  I need to write it down.  Cardiovascular: Regular rate and rhythm with a coarse 3/6 ANN MARIE at the LSB.  Respiratory: Lungs clear to auscultation bilaterally.   Abdomen: Soft and nontender.   Musculoskeletal/Extremities: Age-related degenerative joint disease. Significant dextrokyphoscoliosis and bilateral hallux valgus deformities with arthritic deformities also noted in the hands. No peripheral edema.  Integument: No rashes, clinically significant lesions, or skin breakdown.   Cognitive/Psychiatric: There are indications of mild dementia. Affect is euthymic.    DIAGNOSTICS:   Results for orders placed in visit on 08/21/14   BASIC METABOLIC PANEL       Result Value Ref Range    Sodium 142  136-145 mmol/L    Potassium 3.9  3.5-5.0 mmol/L    Chloride 110 (*)  mmol/L    CO2 21 (*) 22-31 mmol/L    Anion Gap, Calculation 11  5-18 mmol/L    Glucose 95   mg/dL    Calcium 9.6  8.5-10.5 mg/dL    BUN 28  8-28 mg/dL    Creatinine 0.72  0.60-1.10 mg/dL    GFR MDRD Af Amer >60  >60 mL/min/1.73m2    GFR MDRD Non Af Amer >60  >60 mL/min/1.73m2     Lab Results   Component Value Date    WBC 4.7 06/22/2017    HGB 11.3 (L) 06/22/2017    HCT 33.6 (L) 06/22/2017    MCV 91 06/22/2017     06/22/2017     CrCl cannot be calculated (Patient's most recent sCr result is older than the maximum 5 days allowed.).  Lab Results   Component Value Date    TSH 2.42 06/10/2015     Lab Results   Component Value Date    ALT 10 06/10/2015    AST 18 06/10/2015    ALKPHOS 69 06/10/2015    BILITOT 0.3 06/10/2015     ASSESSMENT/Plan:      ICD-10-CM    1.  Late onset Alzheimer's disease without behavioral disturbance G30.1     F02.80    2. Presbycusis of both ears H91.13    3. Primary osteoarthritis involving multiple joints M15.0    4. Idiopathic hypotension I95.0    5. Constipation, unspecified constipation type K59.00    6. Anemia, unspecified type D64.9    7. S/P dilatation of esophageal stricture Z98.890     Z87.19      CHANGES:  None.    CARE PLAN:    The care plan has been reviewed and all orders signed. Changes to care plan, if any, as noted. Otherwise, continue care plan of care.      Electronically signed by: Braxton Messer, CNP

## 2021-06-15 NOTE — PROGRESS NOTES
[facility]    Facility:   [facility] [[id number]]   Code Status: DNR/DNI       Chief Complaint   Patient presents with     Review Of Multiple Medical Conditions     LT 12/31/17.       HPI:  Naina is a 89 y.o. female seen today in routine physician LT follow-up at [facility]. She has diagnoses of HTN, not currently on BP meds, anemia on iron, DJD, dementia. She has visual impairment and is on eye drops.     No interim concerns since last visit. No med changes. Weight is stable .Appetite per usual. No concerns per nursing. She has not been ill recently with cough cold or congestion. She is pleasant and cooperative.      Past Medical History:  Past Medical History:   Diagnosis Date     Cancer     Anal     Constipation     Chronic     Dementia      Hypertension      Sciatica        Medications:  Current Outpatient Prescriptions   Medication Sig     acetaminophen (TYLENOL) 325 MG tablet Take 650 mg by mouth every morning.     acetaminophen (TYLENOL) 325 MG tablet Take 650 mg by mouth bedtime.      acetaminophen (TYLENOL) 325 MG tablet Take 650 mg by mouth every 4 (four) hours as needed for pain.     aspirin 81 mg chewable tablet Chew 81 mg every morning.     bisacodyl (DULCOLAX) 10 mg suppository Insert 10 mg into the rectum daily as needed.     calcium, as carbonate, (TUMS) 200 mg calcium (500 mg) chewable tablet Chew 1 tablet 2 (two) times a day.     docusate sodium (COLACE) 100 MG capsule Take 100 mg by mouth once daily.     dorzolamide-timolol (COSOPT) 22.3-6.8 mg/mL ophthalmic solution Administer 1 drop to both eyes 2 (two) times a day.     ferrous sulfate 325 (65 FE) MG tablet Take 1 tablet by mouth daily with breakfast.      latanoprost (XALATAN) 0.005 % ophthalmic solution Administer 1 drop to both eyes bedtime.     omeprazole (PRILOSEC) 20 MG capsule Take 20 mg by mouth daily.     polyethylene glycol (MIRALAX) 17 gram packet Take 17 g by mouth daily.       polyethylene glycol (MIRALAX) 17 gram packet Take 17 g by mouth daily as needed.     polyvinyl alcohol (LIQUIFILM TEARS) 1.4 % ophthalmic solution Administer 1 drop to both eyes every 2 (two) hours as needed for dry eyes.       Physical Exam:  General: Patient is alert, pleasant elderly female, no distress.   Vitals: /70, Temp 97.2, Pulse 68, RR 18, O2 sat 96 %RA.  HEENT: Head is NCAT. Eyes show no injection or icterus. Nares negative. Oropharynx well hydrated.  Lungs: Clear bilaterally. No wheezes.  Cardiovascular: Regular rate and rhythm, systolic murmur.  Abdomen: Soft, no tenderness on exam. Bowel sounds present. No guarding rebound or rigidity.  Extremities: No edema is noted.  Musculoskeletal: Age related degen changes.  Psych: Mood appears good.      Labs:  6/10/15: TSH 2.42.  2/15/16: Na 139, K 4.0, BUN 31, Cr 0.88, GFR 60.  8/23/16: wbc 5.8, hgb 11.4, plts 191.  6/22/17: wbc 4.7, hgb 11.3, plts 187.      Assessment/Plan:  1. HTN. Not on meds, BPs acceptable. Cont to monitor.  2. DJD. Occ pain in back, managed.   3. Anemia. Mild. Continues on iron.  4. Dementia. No disruptive behaviors noted.    Overall she is stable and no changes to care plan.      Electronically signed by: Sandra Wing MD

## 2021-06-16 PROBLEM — R41.82 ALTERED MENTAL STATUS: Status: ACTIVE | Noted: 2019-01-01

## 2021-06-16 PROBLEM — R63.4 WEIGHT LOSS: Status: ACTIVE | Noted: 2018-01-01

## 2021-06-16 PROBLEM — R64 CACHEXIA (H): Status: ACTIVE | Noted: 2019-01-01

## 2021-06-16 PROBLEM — R41.0 DELIRIUM: Status: ACTIVE | Noted: 2019-01-01

## 2021-06-17 NOTE — PROGRESS NOTES
"[facility]    Facility:   [facility] [[id number]]   Code Status: DNR/DNI       Chief Complaint   Patient presents with     Review Of Multiple Medical Conditions     LTC 4/30/18.       HPI:  Naina is a 89 y.o. female seen today in routine physician LT follow up at [facility]. She has diagnoses of HTN, not currently on BP meds, anemia on iron, DJD, dementia. She has visual impairment and is on eye drops.     Naina has been stable. No acute concerns. She continues to ambulate with her walker. States \"I'm full of arthritis but I manage\". She is pleasant and cooperative. No recent illness. Denies shortness of breath, chest pain, diarrhea or abdominal pain. No concerns per nursing. Appetite is good, weight is stable.      Past Medical History:  Past Medical History:   Diagnosis Date     Cancer     Anal     Constipation     Chronic     Dementia      Hypertension      Sciatica        Medications:  Current Outpatient Prescriptions   Medication Sig     acetaminophen (TYLENOL) 325 MG tablet Take 650 mg by mouth every morning.     acetaminophen (TYLENOL) 325 MG tablet Take 650 mg by mouth bedtime.      acetaminophen (TYLENOL) 325 MG tablet Take 650 mg by mouth every 4 (four) hours as needed for pain.     aspirin 81 mg chewable tablet Chew 81 mg every morning.     bisacodyl (DULCOLAX) 10 mg suppository Insert 10 mg into the rectum daily as needed.     calcium, as carbonate, (TUMS) 200 mg calcium (500 mg) chewable tablet Chew 1 tablet 2 (two) times a day.     docusate sodium (COLACE) 100 MG capsule Take 100 mg by mouth once daily.     dorzolamide (TRUSOPT) 2 % ophthalmic solution Administer 1 drop to both eyes 2 (two) times a day.     ferrous sulfate 325 (65 FE) MG tablet Take 1 tablet by mouth daily with breakfast.      latanoprost (XALATAN) 0.005 % ophthalmic solution Administer 1 drop to both eyes bedtime.     omeprazole (PRILOSEC) 20 MG capsule Take 20 mg by mouth " daily.     polyethylene glycol (MIRALAX) 17 gram packet Take 17 g by mouth daily.      polyethylene glycol (MIRALAX) 17 gram packet Take 17 g by mouth daily as needed.     polyvinyl alcohol (LIQUIFILM TEARS) 1.4 % ophthalmic solution Administer 1 drop to both eyes every 2 (two) hours as needed for dry eyes.     timolol maleate (TIMOPTIC) 0.25 % ophthalmic solution Administer 1 drop to both eyes 2 (two) times a day.     timolol maleate (TIMOPTIC) 0.5 % ophthalmic solution Administer 1 drop to both eyes 2 (two) times a day.       Physical Exam:  General: Patient is alert, pleasant elderly female, no distress.   Vitals: /65, Temp 96.5, Pulse 66, RR 18, O2 sat 97 %RA.  HEENT: Head is NCAT. Eyes show no injection or icterus. Nares negative. Oropharynx well hydrated.  Lungs: Clear bilaterally. No wheezes.  Cardiovascular: Regular rate and rhythm, systolic murmur.  Abdomen: Soft, no tenderness on exam. Bowel sounds present. No guarding rebound or rigidity.  Extremities: No edema is noted.  Musculoskeletal: Age related degen changes. Marked deformities of fingers.  Psych: Mood appears good.      Labs:  6/10/15: TSH 2.42.  2/15/16: Na 139, K 4.0, BUN 31, Cr 0.88, GFR 60.  8/23/16: wbc 5.8, hgb 11.4, plts 191.  6/22/17: wbc 4.7, hgb 11.3, plts 187.    Results for orders placed or performed in visit on 01/18/18   Basic Metabolic Panel   Result Value Ref Range    Sodium 142 136 - 145 mmol/L    Potassium 3.9 3.5 - 5.0 mmol/L    Chloride 112 (H) 98 - 107 mmol/L    CO2 24 22 - 31 mmol/L    Anion Gap, Calculation 6 5 - 18 mmol/L    Glucose 74 70 - 125 mg/dL    Calcium 9.1 8.5 - 10.5 mg/dL    BUN 32 (H) 8 - 28 mg/dL    Creatinine 0.74 0.60 - 1.10 mg/dL    GFR MDRD Af Amer >60 >60 mL/min/1.73m2    GFR MDRD Non Af Amer >60 >60 mL/min/1.73m2       Assessment/Plan:  1. Debility. Advanced age, DJD. Overall stable.  2. HTN. Not on BP meds. Overall satisfactory.  3. Anemia. Mild. She is on iron.  4. Cognitive impairment.  Conversationally appropriate. Pleasant.      Electronically signed by: Sandra Wing MD

## 2021-06-18 NOTE — PROGRESS NOTES
[facility]    Name:   Naina Lozada  : 1923  Facility:   [facility] [[id number]]   Room: 217  Code Status: DNR/DNI -   Fac type:   NF (Long Term Care, LTC) -     CHIEF COMPLAINT / REASON FOR VISIT:  Chief Complaint   Patient presents with     Review Of Multiple Medical Conditions     Patient was last seen by me on 2018 and Dr. Wing on 18       HPI: Naina is a very pleasant 89 y.o. female with mild dementia (without behavioral disturbance) and severe hearing loss, who is pretty much independent, ambulatory with a walker and without human assistance, independent with bed mobility, and continent of both bowel and bladder. She exercises every day, usually by walking a good distance around the halls. She has asked me if there is anything to straighten her severely kyphoscoliotic back.  This is about the only complaint she offers.    She has chronic intermittent constipation but may have occasional loose stools. She receives both scheduled Colace (100 mg QD) and MiraLAX (QD and QD PRN), and this has been working well.      She also has a history of anal ulcers, as well as a history of urinary retention with incomplete bladder emptying (no current problems), and some right-sided sciatica, although she has offered no complaints over the course of several recent visits.     In 2015 she was evaluated by GI, undergoing an upper endoscopy and later a dilatation due to a esophageal stricture at the GE junction. Also noted were Ayush's erosions in a large hiatal hernia, and she is now receiving a proton pump inhibitor with iron supplementation to address her anemia. She was subsequently seen by Dr. Sin in 2016 due to recurrence of her swallowing difficulty. She confirmed that sometimes it took 2 or 3 tries to swallow food. When the dilatation was performed one month earlier by Dr. Raghu Mills, he stated in his note that if swallowing  "difficulty recurred, a video swallow esophogram should be done with a follow-up in their esophageal clinic prior to further endoscopic dilatation. This was performed on 04/14/16. The image acquisition was compromised due to limited patient mobility, but what was noted was a moderate sized sliding hiatal hernia with large volume of gastroesophageal reflux, a Schatzki's ring, and small cervical esophageal diverticulum but no significant stricture. She needs to be careful, making sure that what she eats is in small pieces.    She has osteoarthritis and some stiffness all over, particularly in the morning, but she rarely complains and seemed to be doing well on 650 mg of acetaminophen three times per day (0800, 1200, and 2000). Despite her arthritis, she typically tells me, \"I keep active anyway.\"  She exercises, ambulating every morning and evening around the facility.  If at all possible, she wants no one doing anything for her that she can do for herself.    She has a history of anemia.  We did decrease her ferrous sulfate from 325 mg twice daily to 325 mg daily back in May 2017.  A follow-up hemoglobin one month later was 11.3.       CURRENT ISSUES    She previously had a diagnosis of hypertension and was on 12.5 mg of atenolol BID. When she began presenting with systolics consistently below 100, we discontinued her atenolol. Blood pressures continue to be on the low side with systolics still occasionally below 100, although today her blood pressure is within normal limits. She denies any problems with dizziness. 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  She continues to be mostly independent and is doing quite well.     ROS:  No complaints of headaches, chest pain, nausea or vomiting, coughing or congestion, dizziness or dyspnea, dysuria, integumentary issues, problems with sleep, or feelings of depression or anxiety.    Past Medical History:   Diagnosis Date     Cancer     Anal     Constipation     " Chronic     Dementia      Hypertension      Sciatica              No family history on file.    MEDICATIONS: Reviewed from the MAR, physician orders, and earlier progress notes.  Updated by me today (06/07/18) with the adjustment in acetaminophen reflected below.  Current Outpatient Prescriptions   Medication Sig     acetaminophen (TYLENOL) 325 MG tablet Take 650 mg by mouth every morning.     acetaminophen (TYLENOL) 325 MG tablet Take 650 mg by mouth bedtime.      acetaminophen (TYLENOL) 325 MG tablet Take 650 mg by mouth every 4 (four) hours as needed for pain.     aspirin 81 mg chewable tablet Chew 81 mg every morning.     bisacodyl (DULCOLAX) 10 mg suppository Insert 10 mg into the rectum daily as needed.     calcium, as carbonate, (TUMS) 200 mg calcium (500 mg) chewable tablet Chew 1 tablet 2 (two) times a day.     docusate sodium (COLACE) 100 MG capsule Take 100 mg by mouth once daily.     dorzolamide (TRUSOPT) 2 % ophthalmic solution Administer 1 drop to both eyes 2 (two) times a day.     ferrous sulfate 325 (65 FE) MG tablet Take 1 tablet by mouth daily with breakfast.      latanoprost (XALATAN) 0.005 % ophthalmic solution Administer 1 drop to both eyes bedtime.     omeprazole (PRILOSEC) 20 MG capsule Take 20 mg by mouth daily.     polyethylene glycol (MIRALAX) 17 gram packet Take 17 g by mouth daily.      polyethylene glycol (MIRALAX) 17 gram packet Take 17 g by mouth daily as needed.     polyvinyl alcohol (LIQUIFILM TEARS) 1.4 % ophthalmic solution Administer 1 drop to both eyes every 2 (two) hours as needed for dry eyes.     timolol maleate (TIMOPTIC) 0.25 % ophthalmic solution Administer 1 drop to both eyes 2 (two) times a day.     timolol maleate (TIMOPTIC) 0.5 % ophthalmic solution Administer 1 drop to both eyes 2 (two) times a day.     ALLERGIES:   Allergies   Allergen Reactions     Codeine      DIET: Regular.    Vitals:    06/07/18 1055   BP: 108/58   Pulse: 84   Resp: 18   Temp: 97.4  F (36.3  C)    Weight: 106 lb 6.4 oz (48.3 kg)   Weight has been very stable of late. Admission weight was 96.6 pounds.    EXAMINATION:   General: Pleasant, alert, and conversant elderly female, ambulating independently down the hallway with her rolling walker, in no apparent distress. 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  Head: Normocephalic and atraumatic.   Eyes: Deformed left pupil, but sclerae are clear.   ENT: Moist oral mucosa. She has her own teeth. No rhinorrhea or nasal discharge. There is significant/severe hearing loss, although she does seem to hear a bit better out of the right ear.  She does try to lip read, but some words are rather similar.  I need to write it down.  Cardiovascular: Regular rate and rhythm with a coarse 3/6 ANN MARIE at the LSB.  Respiratory: Lungs clear to auscultation bilaterally.   Abdomen: Soft and nontender.   Musculoskeletal/Extremities: Age-related degenerative joint disease. Significant dextrokyphoscoliosis and bilateral hallux valgus deformities with arthritic deformities also noted in the hands. No peripheral edema.  Integument: No rashes, clinically significant lesions, or skin breakdown.   Cognitive/Psychiatric: There are indications of mild dementia. Affect is euthymic.    DIAGNOSTICS:   Results for orders placed in visit on 08/21/14   BASIC METABOLIC PANEL       Result Value Ref Range    Sodium 142  136-145 mmol/L    Potassium 3.9  3.5-5.0 mmol/L    Chloride 110 (*)  mmol/L    CO2 21 (*) 22-31 mmol/L    Anion Gap, Calculation 11  5-18 mmol/L    Glucose 95   mg/dL    Calcium 9.6  8.5-10.5 mg/dL    BUN 28  8-28 mg/dL    Creatinine 0.72  0.60-1.10 mg/dL    GFR MDRD Af Amer >60  >60 mL/min/1.73m2    GFR MDRD Non Af Amer >60  >60 mL/min/1.73m2     Lab Results   Component Value Date    WBC 4.7 06/22/2017    HGB 11.3 (L) 06/22/2017    HCT 33.6 (L) 06/22/2017    MCV 91 06/22/2017     06/22/2017     CrCl cannot be calculated (Patient's most recent sCr  result is older than the maximum 5 days allowed.).  Lab Results   Component Value Date    TSH 2.42 06/10/2015     Lab Results   Component Value Date    ALT 10 06/10/2015    AST 18 06/10/2015    ALKPHOS 69 06/10/2015    BILITOT 0.3 06/10/2015     ASSESSMENT/Plan:      ICD-10-CM    1. Late onset Alzheimer's disease without behavioral disturbance G30.1     F02.80    2. Presbycusis of both ears H91.13    3. Primary osteoarthritis involving multiple joints M15.0    4. Idiopathic hypotension I95.0    5. Constipation, unspecified constipation type K59.00    6. Anemia, unspecified type D64.9    7. Status post dilation of esophageal narrowing Z98.890     Z87.19      CHANGES:  None.    CARE PLAN:    The care plan has been reviewed and all orders signed. Changes to care plan, if any, as noted. Otherwise, continue care plan of care.    Shayy KAPLAN NP student, am scribing for and in the presence of, LES Messer CNP.     ILES CNP, personally performed the services described in this documentation, as scribed by, Shayy Cowart NP student in my presence, and it is both accurate and complete.       Electronically signed by: Shayy Cowart     Electronically signed by:  Braxton Messer CNP

## 2021-06-18 NOTE — LETTER
Letter by Sandra Wing MD at      Author: Sandra Wing MD Service: -- Author Type: --    Filed:  Encounter Date: 12/31/2018 Status: (Other)         Patient: Naina Lozada   MR Number: [id number]   YOB: 1923   Date of Visit: 12/31/2018     [facility]    Facility:   [facility] [[id number]]   Code Status: DNR/DNI       Chief Complaint   Patient presents with   ? Review Of Multiple Medical Conditions     LTC 12/31/18.       HPI:  Naina is a 89 y.o. female seen for routine physician [facility] follow up at [facility]. She has diagnoses of HTN, not currently on BP meds, anemia on iron, DJD, dementia. She is pleasant and has no disruptive behavioral issues, generally pleasantly confused. She has visual impairment and is on eye drops.     She has had some increased confusion and slow decline. A UA was checked due to increased confusion and weakness and she was treated for UTI. UC however returned with mixture of organisms. She improved but not back to prior baseline. Today she has no new complaints. She continues to ambulate with her walker but has needed more assistance and guidance, encouragement. Has arthritic pains. She has not been ill recently with cough cold or congestion. Denies shortness of breath, chest pain, diarrhea or abdominal pain. No specific concerns per nursing.       Past Medical History:  Past Medical History:   Diagnosis Date   ? Cancer (H)     Anal   ? Constipation     Chronic   ? Dementia    ? Hypertension    ? Sciatica        Medications:  Current Outpatient Medications   Medication Sig   ? acetaminophen (TYLENOL) 325 MG tablet Take 650 mg by mouth 3 (three) times a day.    ? acetaminophen (TYLENOL) 325 MG tablet Take 650 mg by mouth every 4 (four) hours as needed for pain.   ? aspirin 81 mg chewable tablet Chew 81 mg every morning.   ? bisacodyl (DULCOLAX) 10 mg suppository Insert 10 mg into the rectum daily as needed.    ? calcium, as carbonate, (TUMS) 200 mg calcium (500 mg) chewable tablet Chew 1 tablet 2 (two) times a day.   ? docusate sodium (COLACE) 100 MG capsule Take 100 mg by mouth once daily.   ? dorzolamide (TRUSOPT) 2 % ophthalmic solution Administer 1 drop to both eyes 2 (two) times a day.   ? ferrous sulfate 325 (65 FE) MG tablet Take 1 tablet by mouth daily with breakfast.    ? latanoprost (XALATAN) 0.005 % ophthalmic solution Administer 1 drop to both eyes bedtime.   ? omeprazole (PRILOSEC) 20 MG capsule Take 20 mg by mouth daily.   ? polyethylene glycol (MIRALAX) 17 gram packet Take 17 g by mouth daily.    ? polyethylene glycol (MIRALAX) 17 gram packet Take 17 g by mouth daily as needed.   ? polyvinyl alcohol (LIQUIFILM TEARS) 1.4 % ophthalmic solution Administer 1 drop to both eyes every 2 (two) hours as needed for dry eyes.   ? timolol maleate (TIMOPTIC) 0.25 % ophthalmic solution Administer 1 drop to both eyes 2 (two) times a day.   ? timolol maleate (TIMOPTIC) 0.5 % ophthalmic solution Administer 1 drop to both eyes 2 (two) times a day.       Physical Exam:  General: Patient is alert, pleasant elderly female, no distress.   Vitals: /62, Temp 97, Pulse 62, RR 18, O2 sat 94% RA.  HEENT: Head is NCAT. Eyes show no injection or icterus. Nares negative. Oropharynx well hydrated.  Lungs: Clear bilaterally. No wheezes.  Cardiovascular: Regular rate and rhythm, systolic murmur.  Abdomen: Soft, no tenderness on exam. Bowel sounds present. No guarding rebound or rigidity.  Extremities: No edema is noted.  Musculoskeletal: Age related degen changes. Marked deformities of fingers.  Psych: Mood appears good.      Labs:  Lab Results   Component Value Date    WBC 4.7 09/06/2018    HGB 11.0 (L) 09/06/2018    HCT 32.9 (L) 09/06/2018    MCV 90 09/06/2018     09/06/2018     Results for orders placed or performed in visit on 12/10/18   Basic Metabolic Panel   Result Value Ref Range    Sodium 142 136 - 145 mmol/L     Potassium 4.3 3.5 - 5.0 mmol/L    Chloride 113 (H) 98 - 107 mmol/L    CO2 22 22 - 31 mmol/L    Anion Gap, Calculation 7 5 - 18 mmol/L    Glucose 75 70 - 125 mg/dL    Calcium 9.4 8.5 - 10.5 mg/dL    BUN 30 (H) 8 - 28 mg/dL    Creatinine 0.71 0.60 - 1.10 mg/dL    GFR MDRD Af Amer >60 >60 mL/min/1.73m2    GFR MDRD Non Af Amer >60 >60 mL/min/1.73m2         Assessment/Plan:  1.General debilitation. Slow decline. Recent concern of UTI, treated with abx, UC showing mixture of organisms.   2. HTN. BPs satisfactory. Not on meds currently.  3. Anemia. Stable. She is on iron.  4. Cognitive decline. Pleasant. Some increased confusion noted per nursing.        Electronically signed by: Sandra Wing MD

## 2021-06-18 NOTE — LETTER
Letter by Braxton Messer CNP at      Author: Braxton Messer CNP Service: -- Author Type: --    Filed:  Encounter Date: 2019 Status: (Other)         Patient: Naina Lozada   MR Number: [id number]   YOB: 1923   Date of Visit: 2019     [facility]    Name:   Naina Lozada  : 1923  Facility:   [facility] [[id number]]   Room: 217  Code Status: DNR/DNI -   Fac type:   NF (Long Term Care, LTC) -     CHIEF COMPLAINT / REASON FOR VISIT:  Chief Complaint   Patient presents with   ? Review Of Multiple Medical Conditions     Patient was last seen by me on 10/25/18 and subsequently seen by Dr. Wing on 18.      HPI: Naina is a very pleasant 89 y.o. female with mild dementia (without behavioral disturbance) and severe hearing loss, who is pretty much independent, ambulatory with a walker and without human assistance, independent with bed mobility, and continent of both bowel and bladder. She exercises every day, usually by walking a good distance around the halls. She has asked me if there is anything to straighten her severely kyphoscoliotic back.  This is about the only complaint she offers.    She has chronic intermittent constipation but may have occasional loose stools. She receives both scheduled Colace (100 mg QD) and MiraLAX (QD and QD PRN), and this has been working well.      She also has a distant history of anal ulcers, as well as a history of urinary retention with incomplete bladder emptying (no current problems), and some right-sided sciatica, although she has offered no complaints over the last few years.     In 2015 she was evaluated by GI, undergoing an upper endoscopy and later a dilatation due to a esophageal stricture at the GE junction. Also noted were Ayush's erosions in a large hiatal hernia, and she is now receiving a proton pump inhibitor with iron supplementation to address her  "anemia. She was subsequently seen by Dr. Sin in April 2016 due to recurrence of her swallowing difficulty. She confirmed that sometimes it took 2 or 3 tries to swallow food. When the dilatation was performed one month earlier by Dr. Raghu Mills, he stated in his note that if swallowing difficulty recurred, a video swallow esophogram should be done with a follow-up in their esophageal clinic prior to further endoscopic dilatation. This was performed on 04/14/16. The image acquisition was compromised due to limited patient mobility, but what was noted was a moderate sized sliding hiatal hernia with large volume of gastroesophageal reflux, a Schatzki's ring, and small cervical esophageal diverticulum but no significant stricture. She needs to be careful, making sure that what she eats is in small pieces.    She has a history of anemia.  We did decrease her ferrous sulfate from 325 mg twice daily to 325 mg daily back in May 2017.  A follow-up hemoglobin one month later was 11.3.       CURRENT ISSUES    Osteoarthritis: General stiffness all over, particularly in the morning, but she rarely complains.up until recently, she was independent and ambulatory every morning; however, the arthritis is taking its toll.  She is mostly wheelchair-bound at this juncture but still able to get around using her legs to propel herself.  She still walks occasionally.  Pain continues to be an issue, but she is doing quite well despite this.  She has described her whole body being in pain, but she does not want more pain medications.  She has told me, \"I have pain, but I tolerate it.\"      She has also been losing some weight, down almost 4.5 pounds since my last visit.  In October 2018, she was started on Thrive ice cream.    ROS: She remains continent of bowel and bladder.  No complaints of headaches, chest pain, nausea or vomiting, coughing or congestion, dizziness or dyspnea, dysuria, integumentary issues, problems with sleep, or " feelings of depression or anxiety.    Past Medical History:   Diagnosis Date   ? Cancer (H)     Anal   ? Constipation     Chronic   ? Dementia    ? Hypertension    ? Sciatica              No family history on file.    MEDICATIONS: Reviewed from the MAR, physician orders, and earlier progress notes.   Current Outpatient Medications   Medication Sig   ? aspirin 81 MG EC tablet Take 81 mg by mouth daily.   ? acetaminophen (TYLENOL) 325 MG tablet Take 650 mg by mouth 3 (three) times a day.    ? acetaminophen (TYLENOL) 325 MG tablet Take 650 mg by mouth every 4 (four) hours as needed for pain.   ? bisacodyl (DULCOLAX) 10 mg suppository Insert 10 mg into the rectum daily as needed.   ? calcium, as carbonate, (TUMS) 200 mg calcium (500 mg) chewable tablet Chew 1 tablet 2 (two) times a day.   ? docusate sodium (COLACE) 100 MG capsule Take 100 mg by mouth once daily.   ? dorzolamide (TRUSOPT) 2 % ophthalmic solution Administer 1 drop to both eyes 2 (two) times a day.   ? ferrous sulfate 325 (65 FE) MG tablet Take 1 tablet by mouth daily with breakfast.    ? latanoprost (XALATAN) 0.005 % ophthalmic solution Administer 1 drop to both eyes bedtime.   ? omeprazole (PRILOSEC) 20 MG capsule Take 20 mg by mouth daily.   ? polyethylene glycol (MIRALAX) 17 gram packet Take 17 g by mouth daily.    ? polyethylene glycol (MIRALAX) 17 gram packet Take 17 g by mouth daily as needed.   ? polyvinyl alcohol (LIQUIFILM TEARS) 1.4 % ophthalmic solution Administer 1 drop to both eyes every 2 (two) hours as needed for dry eyes.   ? timolol maleate (TIMOPTIC) 0.25 % ophthalmic solution Administer 1 drop to both eyes 2 (two) times a day.   ? timolol maleate (TIMOPTIC) 0.5 % ophthalmic solution Administer 1 drop to both eyes 2 (two) times a day.     ALLERGIES:   Allergies   Allergen Reactions   ? Codeine      DIET: Regular.  Thrive ice cream.    Vitals:    02/14/19 1400   BP: 130/71   Pulse: 67   Resp: 18   Temp: 97.2  F (36.2  C)   SpO2: 95%  "  Weight: 100 lb 12.8 oz (45.7 kg)   Height: 4' 10\" (1.473 m)   Weight has been very stable of late. Admission weight was 96.6 pounds.    EXAMINATION:   General: Frail-appearing elderly female, sitting in a wheelchair, scooting around using her legs to propel her, in no apparent distress.  Head: Normocephalic and atraumatic.   Eyes: Deformed left pupil, but sclerae are clear.   ENT: Moist oral mucosa. She has her own teeth. No rhinorrhea or nasal discharge. There is significant/severe hearing loss, although she does seem to hear a bit better out of the right ear.  She does try to lip read, but some words are rather similar.  I need to write it down.  Cardiovascular: Regular rate and rhythm with a coarse 3/6 ANN MARIE at the LSB.  Respiratory: Lungs clear to auscultation bilaterally.   Abdomen: Soft and nontender.   Musculoskeletal/Extremities: Age-related degenerative joint disease. Significant dextrokyphoscoliosis and bilateral hallux valgus deformities with arthritic deformities also noted in the hands. No peripheral edema.  Integument: No rashes, clinically significant lesions, or skin breakdown.   Cognitive/Psychiatric: There are indications of mild dementia. Affect is euthymic.    DIAGNOSTICS:   Results for orders placed in visit on 08/21/14   BASIC METABOLIC PANEL       Result Value Ref Range    Sodium 142  136-145 mmol/L    Potassium 3.9  3.5-5.0 mmol/L    Chloride 110 (*)  mmol/L    CO2 21 (*) 22-31 mmol/L    Anion Gap, Calculation 11  5-18 mmol/L    Glucose 95   mg/dL    Calcium 9.6  8.5-10.5 mg/dL    BUN 28  8-28 mg/dL    Creatinine 0.72  0.60-1.10 mg/dL    GFR MDRD Af Amer >60  >60 mL/min/1.73m2    GFR MDRD Non Af Amer >60  >60 mL/min/1.73m2     Lab Results   Component Value Date    WBC 4.7 09/06/2018    HGB 11.0 (L) 09/06/2018    HCT 32.9 (L) 09/06/2018    MCV 90 09/06/2018     09/06/2018     CrCl cannot be calculated (Patient's most recent lab result is older than the maximum 5 days " allowed.).  Lab Results   Component Value Date    TSH 2.42 06/10/2015     Lab Results   Component Value Date    ALT 10 06/10/2015    AST 18 06/10/2015    ALKPHOS 69 06/10/2015    BILITOT 0.3 06/10/2015     ASSESSMENT/Plan:      ICD-10-CM    1. Late onset Alzheimer's disease without behavioral disturbance G30.1     F02.80    2. Presbycusis of both ears H91.13    3. Primary osteoarthritis involving multiple joints M15.0    4. Weight loss R63.4    5. Cachexia (H) R64    6. Idiopathic hypotension I95.0    7. Constipation, unspecified constipation type K59.00    8. Iron deficiency anemia, unspecified iron deficiency anemia type D50.9    9. S/P dilatation of esophageal stricture Z98.890     Z87.19    10. Open-angle glaucoma, unspecified glaucoma stage, unspecified laterality, unspecified open-angle glaucoma type H40.10X0      CHANGES:  None.    CARE PLAN:    The care plan has been reviewed and all orders signed. Changes to care plan, if any, as noted. Otherwise, continue care plan of care.    The above has been created using voice recognition software.  Please be aware that this may unintentionally produce inaccuracies and/or nonsensical sentences.      Electronically signed by:  Braxton Messer CNP

## 2021-06-19 NOTE — LETTER
"Letter by Sandra Wing MD at      Author: Sandra Wing MD Service: -- Author Type: --    Filed:  Encounter Date: 8/26/2019 Status: (Other)         Patient: Naina Lozada   MR Number: [id number]   YOB: 1923   Date of Visit: 8/26/2019     [facility]    Facility:   [facility] [[id number]]   Code Status: DNR/DNI       Chief Complaint   Patient presents with   ? Review Of Multiple Medical Conditions     LTC 8/26/19.       HPI:  Naina is a 89 y.o. female seen for routine physician [facility] follow up at [facility]. She has diagnoses of HTN, not currently on BP meds, anemia on iron, DJD, dementia. She has no disruptive behavioral issues, generally pleasantly confused. She has visual impairment and is on eye drops.     Today:  She seems pretty stable. No interim concerns since my last visit. No medication changes. She uses wheelchair for longer distances but still uses walker in her room. Has arthritis and variable pain. States \"if it weren't for the arthritis I would be out dancing\". She has not been ill recently with cough cold or congestion. No concerns per nursing. No recent falls and no open areas of skin.       Past Medical History:  Past Medical History:   Diagnosis Date   ? Cancer (H)     Anal   ? Constipation     Chronic   ? Dementia    ? Hypertension    ? Sciatica        Medications:  Current Outpatient Medications   Medication Sig   ? acetaminophen (TYLENOL) 325 MG tablet Take 650 mg by mouth 3 (three) times a day.    ? acetaminophen (TYLENOL) 325 MG tablet Take 650 mg by mouth every 4 (four) hours as needed for pain.   ? aspirin 81 MG EC tablet Take 81 mg by mouth daily.   ? bisacodyl (DULCOLAX) 10 mg suppository Insert 10 mg into the rectum daily as needed.   ? calcium, as carbonate, (TUMS) 200 mg calcium (500 mg) chewable tablet Chew 1 tablet 2 (two) times a day.   ? docusate sodium (COLACE) 100 MG capsule Take 100 mg by mouth " once daily.   ? dorzolamide (TRUSOPT) 2 % ophthalmic solution Administer 1 drop to both eyes 2 (two) times a day.   ? ferrous sulfate 325 (65 FE) MG tablet Take 1 tablet by mouth daily with breakfast.    ? latanoprost (XALATAN) 0.005 % ophthalmic solution Administer 1 drop to both eyes bedtime.   ? omeprazole (PRILOSEC) 20 MG capsule Take 20 mg by mouth daily.   ? polyethylene glycol (MIRALAX) 17 gram packet Take 17 g by mouth daily.    ? polyethylene glycol (MIRALAX) 17 gram packet Take 17 g by mouth daily as needed.   ? polyvinyl alcohol (LIQUIFILM TEARS) 1.4 % ophthalmic solution Administer 1 drop to both eyes every 2 (two) hours as needed for dry eyes.   ? timolol maleate (TIMOPTIC) 0.25 % ophthalmic solution Administer 1 drop to both eyes 2 (two) times a day.   ? timolol maleate (TIMOPTIC) 0.5 % ophthalmic solution Administer 1 drop to both eyes 2 (two) times a day.       Physical Exam:  General: Patient is alert, pleasant elderly female, no distress.   Vitals: /66, Temp 97.3, Pulse 72, RR 18, O2 sat 97% RA.  HEENT: Head is NCAT. Eyes show no injection or icterus. Nares negative. Oropharynx well hydrated.  Lungs: Clear bilaterally. No wheezes.  Cardiovascular: Regular rate and rhythm, systolic murmur.  Abdomen: Soft, no tenderness on exam. Bowel sounds present. No guarding rebound or rigidity.  Extremities: No edema is noted.  Musculoskeletal: Age related degen changes. Marked deformities of fingers.  Psych: Mood appears good.      Labs:  Lab Results   Component Value Date    WBC 4.7 09/06/2018    HGB 11.0 (L) 09/06/2018    HCT 32.9 (L) 09/06/2018    MCV 90 09/06/2018     09/06/2018     Results for orders placed or performed in visit on 12/10/18   Basic Metabolic Panel   Result Value Ref Range    Sodium 142 136 - 145 mmol/L    Potassium 4.3 3.5 - 5.0 mmol/L    Chloride 113 (H) 98 - 107 mmol/L    CO2 22 22 - 31 mmol/L    Anion Gap, Calculation 7 5 - 18 mmol/L    Glucose 75 70 - 125 mg/dL    Calcium  9.4 8.5 - 10.5 mg/dL    BUN 30 (H) 8 - 28 mg/dL    Creatinine 0.71 0.60 - 1.10 mg/dL    GFR MDRD Af Amer >60 >60 mL/min/1.73m2    GFR MDRD Non Af Amer >60 >60 mL/min/1.73m2       Assessment/Plan:  1. Debility. Advanced age, DJD. Uses wheelchair more than walker.   2. HTN. BPs overall acceptable, not on medications.  3. Anemia. She continues on iron supplement. Last hgb was 11.   4. Cognitive decline. Pleasant and forgetful.     Overall she appears stable, no new orders today.        Electronically signed by: Sandra Wing MD

## 2021-06-19 NOTE — LETTER
Letter by Sandra Wing MD at      Author: Sandra Wing MD Service: -- Author Type: --    Filed:  Encounter Date: 4/29/2019 Status: (Other)         Patient: Naina Lozada   MR Number: [id number]   YOB: 1923   Date of Visit: 4/29/2019     [facility]    Facility:   [facility] [[id number]]   Code Status: DNR/DNI       Chief Complaint   Patient presents with   ? Review Of Multiple Medical Conditions     LTC 4/29/19.       HPI:  Naina is a 89 y.o. female seen for routine physician [facility] follow up at [facility]. She has diagnoses of HTN, not currently on BP meds, anemia on iron, DJD, dementia. She is pleasant and has no disruptive behavioral issues, generally pleasantly confused. She has visual impairment and is on eye drops.     Today:  Current UTI finished abx, some increased confusion and delusions documented per nursing. Today she has no new complaints. She continues to ambulate with her walker but has needed more assistance and guidance, encouragement, uses wheelchair more lately. Has arthritic pains. She has not been ill recently with cough cold or congestion. Denies shortness of breath, chest pain, diarrhea or abdominal pain.      Past Medical History:  Past Medical History:   Diagnosis Date   ? Cancer (H)     Anal   ? Constipation     Chronic   ? Dementia    ? Hypertension    ? Sciatica        Medications:  Current Outpatient Medications   Medication Sig   ? acetaminophen (TYLENOL) 325 MG tablet Take 650 mg by mouth 3 (three) times a day.    ? acetaminophen (TYLENOL) 325 MG tablet Take 650 mg by mouth every 4 (four) hours as needed for pain.   ? aspirin 81 MG EC tablet Take 81 mg by mouth daily.   ? bisacodyl (DULCOLAX) 10 mg suppository Insert 10 mg into the rectum daily as needed.   ? calcium, as carbonate, (TUMS) 200 mg calcium (500 mg) chewable tablet Chew 1 tablet 2 (two) times a day.   ? docusate sodium (COLACE) 100 MG  capsule Take 100 mg by mouth once daily.   ? dorzolamide (TRUSOPT) 2 % ophthalmic solution Administer 1 drop to both eyes 2 (two) times a day.   ? ferrous sulfate 325 (65 FE) MG tablet Take 1 tablet by mouth daily with breakfast.    ? latanoprost (XALATAN) 0.005 % ophthalmic solution Administer 1 drop to both eyes bedtime.   ? omeprazole (PRILOSEC) 20 MG capsule Take 20 mg by mouth daily.   ? polyethylene glycol (MIRALAX) 17 gram packet Take 17 g by mouth daily.    ? polyethylene glycol (MIRALAX) 17 gram packet Take 17 g by mouth daily as needed.   ? polyvinyl alcohol (LIQUIFILM TEARS) 1.4 % ophthalmic solution Administer 1 drop to both eyes every 2 (two) hours as needed for dry eyes.   ? timolol maleate (TIMOPTIC) 0.25 % ophthalmic solution Administer 1 drop to both eyes 2 (two) times a day.   ? timolol maleate (TIMOPTIC) 0.5 % ophthalmic solution Administer 1 drop to both eyes 2 (two) times a day.       Physical Exam:  General: Patient is alert, pleasant elderly female, no distress.   Vitals: /62, Temp 97.5, Pulse 62, RR 20, O2 sat 95% RA.  HEENT: Head is NCAT. Eyes show no injection or icterus. Nares negative. Oropharynx well hydrated.  Lungs: Clear bilaterally. No wheezes.  Cardiovascular: Regular rate and rhythm, systolic murmur.  Abdomen: Soft, no tenderness on exam. Bowel sounds present. No guarding rebound or rigidity.  Extremities: No edema is noted.  Musculoskeletal: Age related degen changes. Marked deformities of fingers.  Psych: Mood appears good.      Labs:  Lab Results   Component Value Date    WBC 4.7 09/06/2018    HGB 11.0 (L) 09/06/2018    HCT 32.9 (L) 09/06/2018    MCV 90 09/06/2018     09/06/2018     Results for orders placed or performed in visit on 12/10/18   Basic Metabolic Panel   Result Value Ref Range    Sodium 142 136 - 145 mmol/L    Potassium 4.3 3.5 - 5.0 mmol/L    Chloride 113 (H) 98 - 107 mmol/L    CO2 22 22 - 31 mmol/L    Anion Gap, Calculation 7 5 - 18 mmol/L    Glucose  75 70 - 125 mg/dL    Calcium 9.4 8.5 - 10.5 mg/dL    BUN 30 (H) 8 - 28 mg/dL    Creatinine 0.71 0.60 - 1.10 mg/dL    GFR MDRD Af Amer >60 >60 mL/min/1.73m2    GFR MDRD Non Af Amer >60 >60 mL/min/1.73m2         Assessment/Plan:  1. DJD. General debilitation. Slow decline, seems to use wheelchair more than walker now.   2. HTN. Not on medications. BPs satisfactory.  3. Anemia. She is on iron. Last hgb at 11, stable.   4. Cognitive decline. Pleasant and forgetful. Confusion noted per nursing.  5. UTI. Done with abx. No fever.         Electronically signed by: Sandra Wing MD

## 2021-06-19 NOTE — LETTER
Letter by Braxton Messer CNP at      Author: Braxton Messer CNP Service: -- Author Type: --    Filed:  Encounter Date: 2019 Status: (Other)         Patient: Naina Lozada   MR Number: [id number]   YOB: 1923   Date of Visit: 2019     [facility]    Name:   Naina Lozada  : 1923  Facility:   [facility] [[id number]]   Room: 217  Code Status: DNR/DNI -   Fac type:   NF (Long Term Care, LTC) -     CHIEF COMPLAINT / REASON FOR VISIT:  Chief Complaint   Patient presents with   ? Review Of Multiple Medical Conditions     Patient was last seen by me on 2019 and subsequently seen by Dr. Wing on 2019.      HPI: Naina is a very pleasant 89 y.o. female with mild dementia (without behavioral disturbance) and severe hearing loss, who is pretty much independent, ambulatory with a walker and without human assistance, independent with bed mobility, and continent of both bowel and bladder. She exercises every day, usually by walking a good distance around the halls. She has asked me if there is anything to straighten her severely kyphoscoliotic back.  This is about the only complaint she offers.    She has chronic intermittent constipation but may have occasional loose stools. She receives both scheduled Colace (100 mg QD) and MiraLAX (QD and QD PRN), and this has been working well.      She also has a distant history of anal ulcers, as well as a history of urinary retention with incomplete bladder emptying (no current problems), and some right-sided sciatica, although she has offered no complaints over the last few years.     In 2015 she was evaluated by GI, undergoing an upper endoscopy and later a dilatation due to a esophageal stricture at the GE junction. Also noted were Ayush's erosions in a large hiatal hernia, and she is now receiving a proton pump inhibitor with iron supplementation to address her  "anemia. She was subsequently seen by Dr. Sin in April 2016 due to recurrence of her swallowing difficulty. She confirmed that sometimes it took 2 or 3 tries to swallow food. When the dilatation was performed one month earlier by Dr. Raghu Mills, he stated in his note that if swallowing difficulty recurred, a video swallow esophogram should be done with a follow-up in their esophageal clinic prior to further endoscopic dilatation. This was performed on 04/14/16. The image acquisition was compromised due to limited patient mobility, but what was noted was a moderate sized sliding hiatal hernia with large volume of gastroesophageal reflux, a Schatzki's ring, and small cervical esophageal diverticulum but no significant stricture. She needs to be careful, making sure that what she eats is in small pieces.    She has a history of anemia.  We did decrease her ferrous sulfate from 325 mg twice daily to 325 mg daily back in May 2017.  A follow-up hemoglobin one month later was 11.3.       CURRENT ISSUES    Osteoarthritis: General stiffness all over, particularly in the morning, but she rarely complained.  Up until my last visit, she was independent and ambulatory every morning; however, the arthritis was taking its toll.  She became mostly wheelchair-bound but was still able to get around using her legs to propel herself.  She was still walking occasionally.  Pain continues to be an issue, but she is nothing if not persistent.  She has described her whole body being in pain, but she does not want more pain medications.  Today, she has gotten herself dressed and is ambulating again independently.  She states, \"I suppose I still have pain, but I walk anyway.\"    Hearing loss: Quite significant.  She can hear with lips almost to the ears.    Weight loss: Unfortunately, she is losing weight.  She was down 4.5 pounds at my last visit and is down another 5.2 pounds.  In October 2018, she was started on Thrive ice " cream.    ROS: She remains continent of bowel and bladder.  No complaints of headaches, chest pain, nausea or vomiting, coughing or congestion, dizziness or dyspnea, dysuria, integumentary issues, problems with sleep, or feelings of depression or anxiety.    Past Medical History:   Diagnosis Date   ? Cancer (H)     Anal   ? Constipation     Chronic   ? Dementia    ? Hypertension    ? Sciatica              No family history on file.    MEDICATIONS: Reviewed from the MAR, physician orders, and earlier progress notes.   Current Outpatient Medications   Medication Sig   ? acetaminophen (TYLENOL) 325 MG tablet Take 650 mg by mouth 3 (three) times a day.    ? acetaminophen (TYLENOL) 325 MG tablet Take 650 mg by mouth every 4 (four) hours as needed for pain.   ? aspirin 81 MG EC tablet Take 81 mg by mouth daily.   ? bisacodyl (DULCOLAX) 10 mg suppository Insert 10 mg into the rectum daily as needed.   ? calcium, as carbonate, (TUMS) 200 mg calcium (500 mg) chewable tablet Chew 1 tablet 2 (two) times a day.   ? docusate sodium (COLACE) 100 MG capsule Take 100 mg by mouth once daily.   ? dorzolamide (TRUSOPT) 2 % ophthalmic solution Administer 1 drop to both eyes 2 (two) times a day.   ? ferrous sulfate 325 (65 FE) MG tablet Take 1 tablet by mouth daily with breakfast.    ? latanoprost (XALATAN) 0.005 % ophthalmic solution Administer 1 drop to both eyes bedtime.   ? omeprazole (PRILOSEC) 20 MG capsule Take 20 mg by mouth daily.   ? polyethylene glycol (MIRALAX) 17 gram packet Take 17 g by mouth daily.    ? polyethylene glycol (MIRALAX) 17 gram packet Take 17 g by mouth daily as needed.   ? polyvinyl alcohol (LIQUIFILM TEARS) 1.4 % ophthalmic solution Administer 1 drop to both eyes every 2 (two) hours as needed for dry eyes.   ? timolol maleate (TIMOPTIC) 0.25 % ophthalmic solution Administer 1 drop to both eyes 2 (two) times a day.   ? timolol maleate (TIMOPTIC) 0.5 % ophthalmic solution Administer 1 drop to both eyes 2  "(two) times a day.     ALLERGIES:   Allergies   Allergen Reactions   ? Codeine      DIET: Regular.  Thrive ice cream.    Vitals:    06/18/19 1638   BP: 118/77   Pulse: 66   Resp: 18   Temp: (!) 95  F (35  C)   SpO2: 94%   Weight: (!) 95 lb 9.6 oz (43.4 kg)   Height: 4' 10\" (1.473 m)   Admission weight was 96.6 pounds.  After that, she put on weight but has been losing lately, dropping of 4.5 pounds earlier and now 5.2 pounds.    EXAMINATION:   General: Frail-appearing elderly female, sitting in a wheelchair, scooting around using her legs to propel her, in no apparent distress.  Head: Normocephalic and atraumatic.   Eyes: Deformed left pupil, but sclerae are clear.   ENT: Moist oral mucosa. She has her own teeth. No rhinorrhea or nasal discharge. There is significant/severe hearing loss, although she does seem to hear a bit better out of the right ear.   Cardiovascular: Regular rate and rhythm with a coarse 3/6 ANN MARIE at the LSB.  Respiratory: Lungs clear to auscultation bilaterally.   Abdomen: Soft and nontender.   Musculoskeletal/Extremities: Age-related degenerative joint disease. Significant dextrokyphoscoliosis and bilateral hallux valgus deformities with arthritic deformities also noted in the hands. No peripheral edema.  Integument: No rashes, clinically significant lesions, or skin breakdown.   Cognitive/Psychiatric: There are indications of mild dementia. Affect is euthymic.    DIAGNOSTICS:   Results for orders placed or performed in visit on 12/10/18   Basic Metabolic Panel   Result Value Ref Range    Sodium 142 136 - 145 mmol/L    Potassium 4.3 3.5 - 5.0 mmol/L    Chloride 113 (H) 98 - 107 mmol/L    CO2 22 22 - 31 mmol/L    Anion Gap, Calculation 7 5 - 18 mmol/L    Glucose 75 70 - 125 mg/dL    Calcium 9.4 8.5 - 10.5 mg/dL    BUN 30 (H) 8 - 28 mg/dL    Creatinine 0.71 0.60 - 1.10 mg/dL    GFR MDRD Af Amer >60 >60 mL/min/1.73m2    GFR MDRD Non Af Amer >60 >60 mL/min/1.73m2     Lab Results   Component Value " Date    WBC 4.7 09/06/2018    HGB 11.0 (L) 09/06/2018    HCT 32.9 (L) 09/06/2018    MCV 90 09/06/2018     09/06/2018     CrCl cannot be calculated (Patient's most recent lab result is older than the maximum 5 days allowed.).  Lab Results   Component Value Date    TSH 2.42 06/10/2015     Lab Results   Component Value Date    ALT 10 06/10/2015    AST 18 06/10/2015    ALKPHOS 69 06/10/2015    BILITOT 0.3 06/10/2015     ASSESSMENT/Plan:      ICD-10-CM    1. Late onset Alzheimer's disease without behavioral disturbance (mild) G30.1     F02.80    2. Presbycusis of both ears H91.13    3. Primary osteoarthritis involving multiple joints M15.0    4. Weight loss R63.4    5. Idiopathic hypotension I95.0    6. Constipation, unspecified constipation type K59.00    7. Iron deficiency anemia, unspecified iron deficiency anemia type D50.9    8. S/P dilatation of esophageal stricture Z98.890     Z87.19      CHANGES:  None.    CARE PLAN:    The care plan has been reviewed and all orders signed. Changes to care plan, if any, as noted. Otherwise, continue care plan of care.    The above has been created using voice recognition software.  Please be aware that this may unintentionally produce inaccuracies and/or nonsensical sentences.      Electronically signed by:  Braxton Messer CNP

## 2021-06-20 NOTE — PROGRESS NOTES
[facility]    Facility:   [facility] [[id number]]   Code Status: DNR/DNI       Chief Complaint   Patient presents with     Review Of Multiple Medical Conditions     LTC 8/31/18.       HPI:  Naina is a 89 y.o. female seen today in routine physician LT follow up at [facility]. She has diagnoses of HTN, not currently on BP meds, anemia on iron, DJD, dementia. She has visual impairment and is on eye drops.     No interim concerns since my last visit. Naina has been stable. She continues to ambulate with her walker. Has arthritic pains. She is pleasant and cooperative. She has not been ill recently with cough cold or congestion.  Denies shortness of breath, chest pain, diarrhea or abdominal pain. No concerns per nursing. Appetite is good, weight is stable.      Past Medical History:  Past Medical History:   Diagnosis Date     Cancer (H)     Anal     Constipation     Chronic     Dementia      Hypertension      Sciatica        Medications:  Current Outpatient Prescriptions   Medication Sig     acetaminophen (TYLENOL) 325 MG tablet Take 650 mg by mouth 3 (three) times a day.      acetaminophen (TYLENOL) 325 MG tablet Take 650 mg by mouth every 4 (four) hours as needed for pain.     aspirin 81 mg chewable tablet Chew 81 mg every morning.     bisacodyl (DULCOLAX) 10 mg suppository Insert 10 mg into the rectum daily as needed.     calcium, as carbonate, (TUMS) 200 mg calcium (500 mg) chewable tablet Chew 1 tablet 2 (two) times a day.     docusate sodium (COLACE) 100 MG capsule Take 100 mg by mouth once daily.     dorzolamide (TRUSOPT) 2 % ophthalmic solution Administer 1 drop to both eyes 2 (two) times a day.     ferrous sulfate 325 (65 FE) MG tablet Take 1 tablet by mouth daily with breakfast.      latanoprost (XALATAN) 0.005 % ophthalmic solution Administer 1 drop to both eyes bedtime.     omeprazole (PRILOSEC) 20 MG capsule Take 20 mg by mouth daily.      polyethylene glycol (MIRALAX) 17 gram packet Take 17 g by mouth daily.      polyethylene glycol (MIRALAX) 17 gram packet Take 17 g by mouth daily as needed.     polyvinyl alcohol (LIQUIFILM TEARS) 1.4 % ophthalmic solution Administer 1 drop to both eyes every 2 (two) hours as needed for dry eyes.     timolol maleate (TIMOPTIC) 0.25 % ophthalmic solution Administer 1 drop to both eyes 2 (two) times a day.     timolol maleate (TIMOPTIC) 0.5 % ophthalmic solution Administer 1 drop to both eyes 2 (two) times a day.       Physical Exam:  General: Patient is alert, pleasant elderly female, no distress.   Vitals: /62, Temp 96, Pulse 64, RR 18.  HEENT: Head is NCAT. Eyes show no injection or icterus. Nares negative. Oropharynx well hydrated.  Lungs: Clear bilaterally. No wheezes.  Cardiovascular: Regular rate and rhythm, systolic murmur.  Abdomen: Soft, no tenderness on exam. Bowel sounds present. No guarding rebound or rigidity.  Extremities: No edema is noted.  Musculoskeletal: Age related degen changes. Marked deformities of fingers.  Psych: Mood appears good.      Labs:  6/10/15: TSH 2.42.  2/15/16: Na 139, K 4.0, BUN 31, Cr 0.88, GFR 60.  8/23/16: wbc 5.8, hgb 11.4, plts 191.  6/22/17: wbc 4.7, hgb 11.3, plts 187.    Lab Results   Component Value Date    WBC 4.7 06/22/2017    HGB 11.3 (L) 06/22/2017    HCT 33.6 (L) 06/22/2017    MCV 91 06/22/2017     06/22/2017     Results for orders placed or performed in visit on 01/18/18   Basic Metabolic Panel   Result Value Ref Range    Sodium 142 136 - 145 mmol/L    Potassium 3.9 3.5 - 5.0 mmol/L    Chloride 112 (H) 98 - 107 mmol/L    CO2 24 22 - 31 mmol/L    Anion Gap, Calculation 6 5 - 18 mmol/L    Glucose 74 70 - 125 mg/dL    Calcium 9.1 8.5 - 10.5 mg/dL    BUN 32 (H) 8 - 28 mg/dL    Creatinine 0.74 0.60 - 1.10 mg/dL    GFR MDRD Af Amer >60 >60 mL/min/1.73m2    GFR MDRD Non Af Amer >60 >60 mL/min/1.73m2       Assessment/Plan:  1. General debility. DJD, advanced  age. Still ambulatory with walker.   2. HTN. Not on meds, BPs acceptable.  3. Anemia. Continue on iron replacement.  4. Cognitive decline. Pleasant, no concerns.        Electronically signed by: Sandra Wing MD

## 2021-06-21 NOTE — PROGRESS NOTES
[facility]    Name:   Naina Lozada  : 1923  Facility:   [facility] [[id number]]   Room: 217  Code Status: DNR/DNI -   Fac type:   NF (Long Term Care, LTC) -     CHIEF COMPLAINT / REASON FOR VISIT:  Chief Complaint   Patient presents with     Review Of Multiple Medical Conditions       HPI: Naina is a very pleasant 89 y.o. female with mild dementia (without behavioral disturbance) and severe hearing loss, who is pretty much independent, ambulatory with a walker and without human assistance, independent with bed mobility, and continent of both bowel and bladder. She exercises every day, usually by walking a good distance around the halls. She has asked me if there is anything to straighten her severely kyphoscoliotic back.  This is about the only complaint she offers.    She has chronic intermittent constipation but may have occasional loose stools. She receives both scheduled Colace (100 mg QD) and MiraLAX (QD and QD PRN), and this has been working well.      She also has a history of anal ulcers, as well as a history of urinary retention with incomplete bladder emptying (no current problems), and some right-sided sciatica, although she has offered no complaints over the course of several recent visits.     In 2015 she was evaluated by GI, undergoing an upper endoscopy and later a dilatation due to a esophageal stricture at the GE junction. Also noted were Ayush's erosions in a large hiatal hernia, and she is now receiving a proton pump inhibitor with iron supplementation to address her anemia. She was subsequently seen by Dr. Sin in 2016 due to recurrence of her swallowing difficulty. She confirmed that sometimes it took 2 or 3 tries to swallow food. When the dilatation was performed one month earlier by Dr. Raghu Mills, he stated in his note that if swallowing difficulty recurred, a video swallow esophogram should be done with a follow-up  "in their esophageal clinic prior to further endoscopic dilatation. This was performed on 04/14/16. The image acquisition was compromised due to limited patient mobility, but what was noted was a moderate sized sliding hiatal hernia with large volume of gastroesophageal reflux, a Schatzki's ring, and small cervical esophageal diverticulum but no significant stricture. She needs to be careful, making sure that what she eats is in small pieces.    She has osteoarthritis and some stiffness all over, particularly in the morning, but she rarely complains and seemed to be doing well on 650 mg of acetaminophen three times per day (0800, 1200, and 2000). Despite her arthritis, she typically tells me, \"I keep active anyway.\"  She exercises, ambulating every morning and evening around the facility.  If at all possible, she wants no one doing anything for her that she can do for herself.    She has a history of anemia.  We did decrease her ferrous sulfate from 325 mg twice daily to 325 mg daily back in May 2017.  A follow-up hemoglobin one month later was 11.3.       CURRENT ISSUES    She previously had a diagnosis of hypertension and was on 12.5 mg of atenolol BID. When she began presenting with systolics consistently below 100, we discontinued her atenolol. Blood pressures continue to be on the low side with systolics still occasionally below 100.  Systolics are never above 130s.  She denies any problems with dizziness. 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  She continues to be mostly independent and is doing quite well.     She did fall on 09/07/18 and developed pain and swelling in her right knee.  X-rays, while showing a suprapatellar joint effusion, did not show any acute bony injury.  Physical therapy did work with her on strengthening.      Pain continues to be an issue, but she is doing quite well despite this.  She tells me, \"my whole body is in pain.  My knees and legs ache.  My body is tired.\"  " "She does not want more pain medications.  She tells me, \"I have pain, but I tolerate it.\"  She is up and walking.  She likes to keep moving, and this keeps her going.  She tells me, \"I do the best I can.  I don't want to end up in a wheelchair.\"  Still, some days she is unable to ambulate and will except the wheelchair graciously.    She has also been losing some weight, down almost 4.5 pounds since my last visit.  110/16/18, she was started on Thrive ice cream.    ROS: She remains continent of bowel and bladder.  No complaints of headaches, chest pain, nausea or vomiting, coughing or congestion, dizziness or dyspnea, dysuria, integumentary issues, problems with sleep, or feelings of depression or anxiety.    Past Medical History:   Diagnosis Date     Cancer (H)     Anal     Constipation     Chronic     Dementia      Hypertension      Sciatica              No family history on file.    MEDICATIONS: Reviewed from the MAR, physician orders, and earlier progress notes.  Updated by me today (06/07/18) with the adjustment in acetaminophen reflected below.  Current Outpatient Prescriptions   Medication Sig     acetaminophen (TYLENOL) 325 MG tablet Take 650 mg by mouth 3 (three) times a day.      acetaminophen (TYLENOL) 325 MG tablet Take 650 mg by mouth every 4 (four) hours as needed for pain.     aspirin 81 mg chewable tablet Chew 81 mg every morning.     bisacodyl (DULCOLAX) 10 mg suppository Insert 10 mg into the rectum daily as needed.     calcium, as carbonate, (TUMS) 200 mg calcium (500 mg) chewable tablet Chew 1 tablet 2 (two) times a day.     docusate sodium (COLACE) 100 MG capsule Take 100 mg by mouth once daily.     dorzolamide (TRUSOPT) 2 % ophthalmic solution Administer 1 drop to both eyes 2 (two) times a day.     ferrous sulfate 325 (65 FE) MG tablet Take 1 tablet by mouth daily with breakfast.      latanoprost (XALATAN) 0.005 % ophthalmic solution Administer 1 drop to both eyes bedtime.     omeprazole " "(PRILOSEC) 20 MG capsule Take 20 mg by mouth daily.     polyethylene glycol (MIRALAX) 17 gram packet Take 17 g by mouth daily.      polyethylene glycol (MIRALAX) 17 gram packet Take 17 g by mouth daily as needed.     polyvinyl alcohol (LIQUIFILM TEARS) 1.4 % ophthalmic solution Administer 1 drop to both eyes every 2 (two) hours as needed for dry eyes.     timolol maleate (TIMOPTIC) 0.25 % ophthalmic solution Administer 1 drop to both eyes 2 (two) times a day.     timolol maleate (TIMOPTIC) 0.5 % ophthalmic solution Administer 1 drop to both eyes 2 (two) times a day.     ALLERGIES:   Allergies   Allergen Reactions     Codeine      DIET: Regular.  Thrive ice cream.    Vitals:    10/25/18 1558   BP: 136/68   Pulse: 74   Resp: 20   Temp: 98.3  F (36.8  C)   SpO2: 95%   Weight: 102 lb (46.3 kg)   Height: 4' 10\" (1.473 m)   Weight has been very stable of late. Admission weight was 96.6 pounds.    EXAMINATION:   General: Pleasant, alert, and conversant elderly female, ambulating independently down the hallway with her rolling walker, in no apparent distress. 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  Head: Normocephalic and atraumatic.   Eyes: Deformed left pupil, but sclerae are clear.   ENT: Moist oral mucosa. She has her own teeth. No rhinorrhea or nasal discharge. There is significant/severe hearing loss, although she does seem to hear a bit better out of the right ear.  She does try to lip read, but some words are rather similar.  I need to write it down.  Cardiovascular: Regular rate and rhythm with a coarse 3/6 ANN MARIE at the LSB.  Respiratory: Lungs clear to auscultation bilaterally.   Abdomen: Soft and nontender.   Musculoskeletal/Extremities: Age-related degenerative joint disease. Significant dextrokyphoscoliosis and bilateral hallux valgus deformities with arthritic deformities also noted in the hands. No peripheral edema.  Integument: No rashes, clinically significant lesions, or skin breakdown. "   Cognitive/Psychiatric: There are indications of mild dementia. Affect is euthymic.    DIAGNOSTICS:   Results for orders placed in visit on 08/21/14   BASIC METABOLIC PANEL       Result Value Ref Range    Sodium 142  136-145 mmol/L    Potassium 3.9  3.5-5.0 mmol/L    Chloride 110 (*)  mmol/L    CO2 21 (*) 22-31 mmol/L    Anion Gap, Calculation 11  5-18 mmol/L    Glucose 95   mg/dL    Calcium 9.6  8.5-10.5 mg/dL    BUN 28  8-28 mg/dL    Creatinine 0.72  0.60-1.10 mg/dL    GFR MDRD Af Amer >60  >60 mL/min/1.73m2    GFR MDRD Non Af Amer >60  >60 mL/min/1.73m2     Lab Results   Component Value Date    WBC 4.7 09/06/2018    HGB 11.0 (L) 09/06/2018    HCT 32.9 (L) 09/06/2018    MCV 90 09/06/2018     09/06/2018     CrCl cannot be calculated (Patient's most recent sCr result is older than the maximum 5 days allowed.).  Lab Results   Component Value Date    TSH 2.42 06/10/2015     Lab Results   Component Value Date    ALT 10 06/10/2015    AST 18 06/10/2015    ALKPHOS 69 06/10/2015    BILITOT 0.3 06/10/2015     ASSESSMENT/Plan:      ICD-10-CM    1. Late onset Alzheimer's disease without behavioral disturbance G30.1     F02.80    2. Presbycusis of both ears H91.13    3. Primary osteoarthritis involving multiple joints M15.0    4. Idiopathic hypotension I95.0    5. Constipation, unspecified constipation type K59.00    6. Anemia, unspecified type D64.9    7. S/P dilatation of esophageal stricture Z98.890     Z87.19    8. Glaucoma H40.9    9. Weight loss R63.4      CHANGES:  None.    CARE PLAN:    The care plan has been reviewed and all orders signed. Changes to care plan, if any, as noted. Otherwise, continue care plan of care.    The above has been created using voice recognition software.  Please be aware that this may unintentionally produce inaccuracies and/or nonsensical sentences.      Electronically signed by:  Braxton Messer CNP   abdomen

## 2021-06-22 NOTE — PROGRESS NOTES
[facility]    Facility:   [facility] [[id number]]   Code Status: DNR/DNI       Chief Complaint   Patient presents with     Review Of Multiple Medical Conditions     LTC 12/31/18.       HPI:  Naina is a 89 y.o. female seen for routine physician LT follow up at [facility]. She has diagnoses of HTN, not currently on BP meds, anemia on iron, DJD, dementia. She is pleasant and has no disruptive behavioral issues, generally pleasantly confused. She has visual impairment and is on eye drops.     She has had some increased confusion and slow decline. A UA was checked due to increased confusion and weakness and she was treated for UTI. UC however returned with mixture of organisms. She improved but not back to prior baseline. Today she has no new complaints. She continues to ambulate with her walker but has needed more assistance and guidance, encouragement. Has arthritic pains. She has not been ill recently with cough cold or congestion. Denies shortness of breath, chest pain, diarrhea or abdominal pain. No specific concerns per nursing.       Past Medical History:  Past Medical History:   Diagnosis Date     Cancer (H)     Anal     Constipation     Chronic     Dementia      Hypertension      Sciatica        Medications:  Current Outpatient Medications   Medication Sig     acetaminophen (TYLENOL) 325 MG tablet Take 650 mg by mouth 3 (three) times a day.      acetaminophen (TYLENOL) 325 MG tablet Take 650 mg by mouth every 4 (four) hours as needed for pain.     aspirin 81 mg chewable tablet Chew 81 mg every morning.     bisacodyl (DULCOLAX) 10 mg suppository Insert 10 mg into the rectum daily as needed.     calcium, as carbonate, (TUMS) 200 mg calcium (500 mg) chewable tablet Chew 1 tablet 2 (two) times a day.     docusate sodium (COLACE) 100 MG capsule Take 100 mg by mouth once daily.     dorzolamide (TRUSOPT) 2 % ophthalmic solution Administer 1 drop to both eyes 2  (two) times a day.     ferrous sulfate 325 (65 FE) MG tablet Take 1 tablet by mouth daily with breakfast.      latanoprost (XALATAN) 0.005 % ophthalmic solution Administer 1 drop to both eyes bedtime.     omeprazole (PRILOSEC) 20 MG capsule Take 20 mg by mouth daily.     polyethylene glycol (MIRALAX) 17 gram packet Take 17 g by mouth daily.      polyethylene glycol (MIRALAX) 17 gram packet Take 17 g by mouth daily as needed.     polyvinyl alcohol (LIQUIFILM TEARS) 1.4 % ophthalmic solution Administer 1 drop to both eyes every 2 (two) hours as needed for dry eyes.     timolol maleate (TIMOPTIC) 0.25 % ophthalmic solution Administer 1 drop to both eyes 2 (two) times a day.     timolol maleate (TIMOPTIC) 0.5 % ophthalmic solution Administer 1 drop to both eyes 2 (two) times a day.       Physical Exam:  General: Patient is alert, pleasant elderly female, no distress.   Vitals: /62, Temp 97, Pulse 62, RR 18, O2 sat 94% RA.  HEENT: Head is NCAT. Eyes show no injection or icterus. Nares negative. Oropharynx well hydrated.  Lungs: Clear bilaterally. No wheezes.  Cardiovascular: Regular rate and rhythm, systolic murmur.  Abdomen: Soft, no tenderness on exam. Bowel sounds present. No guarding rebound or rigidity.  Extremities: No edema is noted.  Musculoskeletal: Age related degen changes. Marked deformities of fingers.  Psych: Mood appears good.      Labs:  Lab Results   Component Value Date    WBC 4.7 09/06/2018    HGB 11.0 (L) 09/06/2018    HCT 32.9 (L) 09/06/2018    MCV 90 09/06/2018     09/06/2018     Results for orders placed or performed in visit on 12/10/18   Basic Metabolic Panel   Result Value Ref Range    Sodium 142 136 - 145 mmol/L    Potassium 4.3 3.5 - 5.0 mmol/L    Chloride 113 (H) 98 - 107 mmol/L    CO2 22 22 - 31 mmol/L    Anion Gap, Calculation 7 5 - 18 mmol/L    Glucose 75 70 - 125 mg/dL    Calcium 9.4 8.5 - 10.5 mg/dL    BUN 30 (H) 8 - 28 mg/dL    Creatinine 0.71 0.60 - 1.10 mg/dL    GFR MDRD  Af Amer >60 >60 mL/min/1.73m2    GFR MDRD Non Af Amer >60 >60 mL/min/1.73m2         Assessment/Plan:  1.General debilitation. Slow decline. Recent concern of UTI, treated with abx, UC showing mixture of organisms.   2. HTN. BPs satisfactory. Not on meds currently.  3. Anemia. Stable. She is on iron.  4. Cognitive decline. Pleasant. Some increased confusion noted per nursing.        Electronically signed by: Sandra Wing MD

## 2021-06-24 NOTE — PROGRESS NOTES
[facility]    Name:   Naina Lozada  : 1923  Facility:   [facility] [[id number]]   Room: 217  Code Status: DNR/DNI -   Fac type:   NF (Long Term Care, LTC) -     CHIEF COMPLAINT / REASON FOR VISIT:  Chief Complaint   Patient presents with     Review Of Multiple Medical Conditions     Patient was last seen by me on 10/25/18 and subsequently seen by Dr. Wing on 18.      HPI: Naina is a very pleasant 89 y.o. female with mild dementia (without behavioral disturbance) and severe hearing loss, who is pretty much independent, ambulatory with a walker and without human assistance, independent with bed mobility, and continent of both bowel and bladder. She exercises every day, usually by walking a good distance around the halls. She has asked me if there is anything to straighten her severely kyphoscoliotic back.  This is about the only complaint she offers.    She has chronic intermittent constipation but may have occasional loose stools. She receives both scheduled Colace (100 mg QD) and MiraLAX (QD and QD PRN), and this has been working well.      She also has a distant history of anal ulcers, as well as a history of urinary retention with incomplete bladder emptying (no current problems), and some right-sided sciatica, although she has offered no complaints over the last few years.     In 2015 she was evaluated by GI, undergoing an upper endoscopy and later a dilatation due to a esophageal stricture at the GE junction. Also noted were Ayush's erosions in a large hiatal hernia, and she is now receiving a proton pump inhibitor with iron supplementation to address her anemia. She was subsequently seen by Dr. Sin in 2016 due to recurrence of her swallowing difficulty. She confirmed that sometimes it took 2 or 3 tries to swallow food. When the dilatation was performed one month earlier by Dr. Raghu Mills, he stated in his note that if  "swallowing difficulty recurred, a video swallow esophogram should be done with a follow-up in their esophageal clinic prior to further endoscopic dilatation. This was performed on 04/14/16. The image acquisition was compromised due to limited patient mobility, but what was noted was a moderate sized sliding hiatal hernia with large volume of gastroesophageal reflux, a Schatzki's ring, and small cervical esophageal diverticulum but no significant stricture. She needs to be careful, making sure that what she eats is in small pieces.    She has a history of anemia.  We did decrease her ferrous sulfate from 325 mg twice daily to 325 mg daily back in May 2017.  A follow-up hemoglobin one month later was 11.3.       CURRENT ISSUES    Osteoarthritis: General stiffness all over, particularly in the morning, but she rarely complains.up until recently, she was independent and ambulatory every morning; however, the arthritis is taking its toll.  She is mostly wheelchair-bound at this juncture but still able to get around using her legs to propel herself.  She still walks occasionally.  Pain continues to be an issue, but she is doing quite well despite this.  She has described her whole body being in pain, but she does not want more pain medications.  She has told me, \"I have pain, but I tolerate it.\"      She has also been losing some weight, down almost 4.5 pounds since my last visit.  In October 2018, she was started on Thrive ice cream.    ROS: She remains continent of bowel and bladder.  No complaints of headaches, chest pain, nausea or vomiting, coughing or congestion, dizziness or dyspnea, dysuria, integumentary issues, problems with sleep, or feelings of depression or anxiety.    Past Medical History:   Diagnosis Date     Cancer (H)     Anal     Constipation     Chronic     Dementia      Hypertension      Sciatica              No family history on file.    MEDICATIONS: Reviewed from the MAR, physician orders, and " "earlier progress notes.   Current Outpatient Medications   Medication Sig     aspirin 81 MG EC tablet Take 81 mg by mouth daily.     acetaminophen (TYLENOL) 325 MG tablet Take 650 mg by mouth 3 (three) times a day.      acetaminophen (TYLENOL) 325 MG tablet Take 650 mg by mouth every 4 (four) hours as needed for pain.     bisacodyl (DULCOLAX) 10 mg suppository Insert 10 mg into the rectum daily as needed.     calcium, as carbonate, (TUMS) 200 mg calcium (500 mg) chewable tablet Chew 1 tablet 2 (two) times a day.     docusate sodium (COLACE) 100 MG capsule Take 100 mg by mouth once daily.     dorzolamide (TRUSOPT) 2 % ophthalmic solution Administer 1 drop to both eyes 2 (two) times a day.     ferrous sulfate 325 (65 FE) MG tablet Take 1 tablet by mouth daily with breakfast.      latanoprost (XALATAN) 0.005 % ophthalmic solution Administer 1 drop to both eyes bedtime.     omeprazole (PRILOSEC) 20 MG capsule Take 20 mg by mouth daily.     polyethylene glycol (MIRALAX) 17 gram packet Take 17 g by mouth daily.      polyethylene glycol (MIRALAX) 17 gram packet Take 17 g by mouth daily as needed.     polyvinyl alcohol (LIQUIFILM TEARS) 1.4 % ophthalmic solution Administer 1 drop to both eyes every 2 (two) hours as needed for dry eyes.     timolol maleate (TIMOPTIC) 0.25 % ophthalmic solution Administer 1 drop to both eyes 2 (two) times a day.     timolol maleate (TIMOPTIC) 0.5 % ophthalmic solution Administer 1 drop to both eyes 2 (two) times a day.     ALLERGIES:   Allergies   Allergen Reactions     Codeine      DIET: Regular.  Thrive ice cream.    Vitals:    02/14/19 1400   BP: 130/71   Pulse: 67   Resp: 18   Temp: 97.2  F (36.2  C)   SpO2: 95%   Weight: 100 lb 12.8 oz (45.7 kg)   Height: 4' 10\" (1.473 m)   Weight has been very stable of late. Admission weight was 96.6 pounds.    EXAMINATION:   General: Frail-appearing elderly female, sitting in a wheelchair, scooting around using her legs to propel her, in no apparent " distress.  Head: Normocephalic and atraumatic.   Eyes: Deformed left pupil, but sclerae are clear.   ENT: Moist oral mucosa. She has her own teeth. No rhinorrhea or nasal discharge. There is significant/severe hearing loss, although she does seem to hear a bit better out of the right ear.  She does try to lip read, but some words are rather similar.  I need to write it down.  Cardiovascular: Regular rate and rhythm with a coarse 3/6 ANN MARIE at the LSB.  Respiratory: Lungs clear to auscultation bilaterally.   Abdomen: Soft and nontender.   Musculoskeletal/Extremities: Age-related degenerative joint disease. Significant dextrokyphoscoliosis and bilateral hallux valgus deformities with arthritic deformities also noted in the hands. No peripheral edema.  Integument: No rashes, clinically significant lesions, or skin breakdown.   Cognitive/Psychiatric: There are indications of mild dementia. Affect is euthymic.    DIAGNOSTICS:   Results for orders placed in visit on 08/21/14   BASIC METABOLIC PANEL       Result Value Ref Range    Sodium 142  136-145 mmol/L    Potassium 3.9  3.5-5.0 mmol/L    Chloride 110 (*)  mmol/L    CO2 21 (*) 22-31 mmol/L    Anion Gap, Calculation 11  5-18 mmol/L    Glucose 95   mg/dL    Calcium 9.6  8.5-10.5 mg/dL    BUN 28  8-28 mg/dL    Creatinine 0.72  0.60-1.10 mg/dL    GFR MDRD Af Amer >60  >60 mL/min/1.73m2    GFR MDRD Non Af Amer >60  >60 mL/min/1.73m2     Lab Results   Component Value Date    WBC 4.7 09/06/2018    HGB 11.0 (L) 09/06/2018    HCT 32.9 (L) 09/06/2018    MCV 90 09/06/2018     09/06/2018     CrCl cannot be calculated (Patient's most recent lab result is older than the maximum 5 days allowed.).  Lab Results   Component Value Date    TSH 2.42 06/10/2015     Lab Results   Component Value Date    ALT 10 06/10/2015    AST 18 06/10/2015    ALKPHOS 69 06/10/2015    BILITOT 0.3 06/10/2015     ASSESSMENT/Plan:      ICD-10-CM    1. Late onset Alzheimer's disease without  behavioral disturbance G30.1     F02.80    2. Presbycusis of both ears H91.13    3. Primary osteoarthritis involving multiple joints M15.0    4. Weight loss R63.4    5. Cachexia (H) R64    6. Idiopathic hypotension I95.0    7. Constipation, unspecified constipation type K59.00    8. Iron deficiency anemia, unspecified iron deficiency anemia type D50.9    9. S/P dilatation of esophageal stricture Z98.890     Z87.19    10. Open-angle glaucoma, unspecified glaucoma stage, unspecified laterality, unspecified open-angle glaucoma type H40.10X0      CHANGES:  None.    CARE PLAN:    The care plan has been reviewed and all orders signed. Changes to care plan, if any, as noted. Otherwise, continue care plan of care.    The above has been created using voice recognition software.  Please be aware that this may unintentionally produce inaccuracies and/or nonsensical sentences.      Electronically signed by:  Braxton Messer CNP